# Patient Record
Sex: FEMALE | Race: WHITE | NOT HISPANIC OR LATINO | Employment: UNEMPLOYED | URBAN - METROPOLITAN AREA
[De-identification: names, ages, dates, MRNs, and addresses within clinical notes are randomized per-mention and may not be internally consistent; named-entity substitution may affect disease eponyms.]

---

## 2017-12-15 ENCOUNTER — HISTORICAL (OUTPATIENT)
Dept: LAB | Facility: HOSPITAL | Age: 42
End: 2017-12-15

## 2017-12-20 ENCOUNTER — HISTORICAL (OUTPATIENT)
Dept: LAB | Facility: HOSPITAL | Age: 42
End: 2017-12-20

## 2020-01-07 ENCOUNTER — HISTORICAL (OUTPATIENT)
Dept: ADMINISTRATIVE | Facility: HOSPITAL | Age: 45
End: 2020-01-07

## 2020-06-30 ENCOUNTER — HISTORICAL (OUTPATIENT)
Dept: ADMINISTRATIVE | Facility: HOSPITAL | Age: 45
End: 2020-06-30

## 2020-07-15 ENCOUNTER — HISTORICAL (OUTPATIENT)
Dept: ADMINISTRATIVE | Facility: HOSPITAL | Age: 45
End: 2020-07-15

## 2020-09-02 ENCOUNTER — HISTORICAL (OUTPATIENT)
Dept: ADMINISTRATIVE | Facility: HOSPITAL | Age: 45
End: 2020-09-02

## 2021-01-20 ENCOUNTER — HISTORICAL (OUTPATIENT)
Dept: ADMINISTRATIVE | Facility: HOSPITAL | Age: 46
End: 2021-01-20

## 2021-02-08 ENCOUNTER — HISTORICAL (OUTPATIENT)
Dept: ADMINISTRATIVE | Facility: HOSPITAL | Age: 46
End: 2021-02-08

## 2021-03-02 ENCOUNTER — HISTORICAL (OUTPATIENT)
Dept: ADMINISTRATIVE | Facility: HOSPITAL | Age: 46
End: 2021-03-02

## 2021-10-12 ENCOUNTER — HISTORICAL (OUTPATIENT)
Dept: ADMINISTRATIVE | Facility: HOSPITAL | Age: 46
End: 2021-10-12

## 2021-10-12 LAB
ABS NEUT (OLG): 11.58 X10(3)/MCL (ref 2.1–9.2)
ALBUMIN SERPL-MCNC: 3.9 GM/DL (ref 3.5–5)
ALBUMIN/GLOB SERPL: 1.1 RATIO (ref 1.1–2)
ALP SERPL-CCNC: 128 UNIT/L (ref 40–150)
ALT SERPL-CCNC: 20 UNIT/L (ref 0–55)
AST SERPL-CCNC: 14 UNIT/L (ref 5–34)
BASOPHILS # BLD AUTO: 0 X10(3)/MCL (ref 0–0.2)
BASOPHILS NFR BLD AUTO: 0 %
BILIRUB SERPL-MCNC: 0.3 MG/DL
BILIRUBIN DIRECT+TOT PNL SERPL-MCNC: 0.1 MG/DL (ref 0–0.5)
BILIRUBIN DIRECT+TOT PNL SERPL-MCNC: 0.2 MG/DL (ref 0–0.8)
BUN SERPL-MCNC: 19.1 MG/DL (ref 7–18.7)
CALCIUM SERPL-MCNC: 9.8 MG/DL (ref 8.4–10.2)
CHLORIDE SERPL-SCNC: 106 MMOL/L (ref 98–107)
CO2 SERPL-SCNC: 26 MMOL/L (ref 22–29)
CREAT SERPL-MCNC: 0.87 MG/DL (ref 0.55–1.02)
EOSINOPHIL # BLD AUTO: 0 X10(3)/MCL (ref 0–0.9)
EOSINOPHIL NFR BLD AUTO: 0 %
ERYTHROCYTE [DISTWIDTH] IN BLOOD BY AUTOMATED COUNT: 14.9 % (ref 11.5–14.5)
GLOBULIN SER-MCNC: 3.4 GM/DL (ref 2.4–3.5)
GLUCOSE SERPL-MCNC: 96 MG/DL (ref 74–100)
HCT VFR BLD AUTO: 42.2 % (ref 35–46)
HGB BLD-MCNC: 13.9 GM/DL (ref 12–16)
IMM GRANULOCYTES # BLD AUTO: 0.06 10*3/UL
IMM GRANULOCYTES NFR BLD AUTO: 0 %
LYMPHOCYTES # BLD AUTO: 1.7 X10(3)/MCL (ref 0.6–4.6)
LYMPHOCYTES NFR BLD AUTO: 12 %
MCH RBC QN AUTO: 28.2 PG (ref 26–34)
MCHC RBC AUTO-ENTMCNC: 32.9 GM/DL (ref 31–37)
MCV RBC AUTO: 85.6 FL (ref 80–100)
MONOCYTES # BLD AUTO: 0.5 X10(3)/MCL (ref 0.1–1.3)
MONOCYTES NFR BLD AUTO: 4 %
NEUTROPHILS # BLD AUTO: 11.58 X10(3)/MCL (ref 2.1–9.2)
NEUTROPHILS NFR BLD AUTO: 83 %
NRBC BLD AUTO-RTO: 0 % (ref 0–0.2)
PLATELET # BLD AUTO: 469 X10(3)/MCL (ref 130–400)
PMV BLD AUTO: 9.4 FL (ref 7.4–10.4)
POTASSIUM SERPL-SCNC: 3.9 MMOL/L (ref 3.5–5.1)
PROT SERPL-MCNC: 7.3 GM/DL (ref 6.4–8.3)
RBC # BLD AUTO: 4.93 X10(6)/MCL (ref 4–5.2)
SODIUM SERPL-SCNC: 139 MMOL/L (ref 136–145)
WBC # SPEC AUTO: 13.9 X10(3)/MCL (ref 4.5–11)

## 2021-10-25 ENCOUNTER — HISTORICAL (OUTPATIENT)
Dept: ADMINISTRATIVE | Facility: HOSPITAL | Age: 46
End: 2021-10-25

## 2021-10-25 LAB
ABS NEUT (OLG): 6.7 X10(3)/MCL (ref 1.5–6.9)
ALBUMIN SERPL-MCNC: 3.7 GM/DL (ref 3.5–5)
ALBUMIN/GLOB SERPL: 0.9 RATIO (ref 1.1–2)
ALP SERPL-CCNC: 126 UNIT/L (ref 40–150)
ALT SERPL-CCNC: 23 UNIT/L (ref 0–55)
AST SERPL-CCNC: 25 UNIT/L (ref 5–34)
BASOPHILS # BLD AUTO: 0.1 X10(3)/MCL (ref 0–0.1)
BASOPHILS NFR BLD AUTO: 1 % (ref 0–1)
BILIRUB SERPL-MCNC: 0.1 MG/DL
BILIRUBIN DIRECT+TOT PNL SERPL-MCNC: 0 MG/DL (ref 0–0.8)
BILIRUBIN DIRECT+TOT PNL SERPL-MCNC: 0.1 MG/DL (ref 0–0.5)
BUN SERPL-MCNC: 13 MG/DL (ref 7–18.7)
CALCIUM SERPL-MCNC: 9.9 MG/DL (ref 8.4–10.2)
CHLORIDE SERPL-SCNC: 108 MMOL/L (ref 98–107)
CHOLEST SERPL-MCNC: 286 MG/DL
CHOLEST/HDLC SERPL: 3 {RATIO} (ref 0–5)
CK SERPL-CCNC: 84 U/L (ref 29–168)
CO2 SERPL-SCNC: 24 MMOL/L (ref 22–29)
CREAT SERPL-MCNC: 1.1 MG/DL (ref 0.55–1.02)
DEPRECATED CALCIDIOL+CALCIFEROL SERPL-MC: 61.5 NG/ML (ref 30–80)
EOSINOPHIL # BLD AUTO: 0.4 X10(3)/MCL (ref 0–0.6)
EOSINOPHIL NFR BLD AUTO: 4 % (ref 0–5)
ERYTHROCYTE [DISTWIDTH] IN BLOOD BY AUTOMATED COUNT: 15.6 % (ref 11.5–17)
GLOBULIN SER-MCNC: 3.9 GM/DL (ref 2.4–3.5)
GLUCOSE SERPL-MCNC: 94 MG/DL (ref 74–100)
HCT VFR BLD AUTO: 44.8 % (ref 36–48)
HDLC SERPL-MCNC: 82 MG/DL (ref 35–60)
HGB BLD-MCNC: 14.1 GM/DL (ref 12–16)
IMM GRANULOCYTES # BLD AUTO: 0.04 10*3/UL (ref 0–0.02)
IMM GRANULOCYTES NFR BLD AUTO: 0.4 % (ref 0–0.43)
LDLC SERPL CALC-MCNC: 191 MG/DL (ref 50–140)
LYMPHOCYTES # BLD AUTO: 2.8 X10(3)/MCL (ref 0.5–4.1)
LYMPHOCYTES NFR BLD AUTO: 27 % (ref 15–40)
MCH RBC QN AUTO: 27 PG (ref 27–34)
MCHC RBC AUTO-ENTMCNC: 32 GM/DL (ref 31–36)
MCV RBC AUTO: 86 FL (ref 80–99)
MONOCYTES # BLD AUTO: 0.6 X10(3)/MCL (ref 0–1.1)
MONOCYTES NFR BLD AUTO: 6 % (ref 4–12)
NEUTROPHILS # BLD AUTO: 6.7 X10(3)/MCL (ref 1.5–6.9)
NEUTROPHILS NFR BLD AUTO: 63 % (ref 43–75)
PLATELET # BLD AUTO: 392 X10(3)/MCL (ref 140–400)
PMV BLD AUTO: 9.6 FL (ref 6.8–10)
POTASSIUM SERPL-SCNC: 4.4 MMOL/L (ref 3.5–5.1)
PROT SERPL-MCNC: 7.6 GM/DL (ref 6.4–8.3)
RBC # BLD AUTO: 5.19 X10(6)/MCL (ref 4.2–5.4)
SODIUM SERPL-SCNC: 143 MMOL/L (ref 136–145)
TRIGL SERPL-MCNC: 67 MG/DL (ref 37–140)
TSH SERPL-ACNC: 2.74 UIU/ML (ref 0.35–4.94)
VLDLC SERPL CALC-MCNC: 13 MG/DL
WBC # SPEC AUTO: 10.6 X10(3)/MCL (ref 4.5–11.5)

## 2022-04-07 ENCOUNTER — HISTORICAL (OUTPATIENT)
Dept: ADMINISTRATIVE | Facility: HOSPITAL | Age: 47
End: 2022-04-07

## 2022-04-23 VITALS
WEIGHT: 168.44 LBS | SYSTOLIC BLOOD PRESSURE: 147 MMHG | BODY MASS INDEX: 31 KG/M2 | HEIGHT: 62 IN | DIASTOLIC BLOOD PRESSURE: 102 MMHG

## 2022-04-30 NOTE — ED PROVIDER NOTES
Patient:   Vimal Beasley             MRN: [id number]            FIN: [id number]               Age:   45 years     Sex:  Female     :  1975   Associated Diagnoses:   Fracture of shaft of tibia and fibula   Author:   Isidro Jones MD      Basic Information   Time seen: Date & time 2020 05:48:00.   History source: Patient.   Arrival mode: Ambulance.   History limitation: None.   Additional information: Chief Complaint from Nursing Triage Note : Chief Complaint   2020 5:37 CDT       Chief Complaint           c/o walking onto porch and twisting ankle and falling, pain noted to right lower leg, deformity noted, daisha splint in place per EMS, motor/sensation intact, pulse noted with doppled, 100 mcg of fentanyl given per EMS.  (Modified) .   Provider/Visit info:   Time Seen:  Isidro Jones MD / 2020 05:45  .   History of Present Illness   The patient presents with 45-year-old female presents via EMS with right ankle pain just prior to arrival.  Patient states she lost her footing and twisted her ankle.  Denies LOC.  No other complaints.  Patient received 100 µg of fentanyl en Route..  The onset was just prior to arrival.  The course/duration of symptoms is constant.  Type of injury: fall.  Location: Right leg. The character of symptoms is pain and swelling.  The degree at present is moderate.  The exacerbating factor is movement.  The relieving factor is rest.  Risk factors consist of none.  Prior episodes: none.  Therapy today: see nurses notes.  Associated symptoms: none.        Review of Systems   Constitutional symptoms:  Negative except as documented in HPI.   Skin symptoms:  Negative except as documented in HPI.   Eye symptoms:  Negative except as documented in HPI.   ENMT symptoms:  Negative except as documented in HPI.   Respiratory symptoms:  Negative except as documented in HPI.   Cardiovascular symptoms:  Negative except as documented in HPI.   Gastrointestinal symptoms:  Negative  except as documented in HPI.   Genitourinary symptoms:  Negative except as documented in HPI.   Musculoskeletal symptoms:  Joint pain.   Neurologic symptoms:  Negative except as documented in HPI.   Psychiatric symptoms:  Negative except as documented in HPI.   Endocrine symptoms:  Negative except as documented in HPI.   Hematologic/Lymphatic symptoms:  Negative except as documented in HPI.   Allergy/immunologic symptoms:  Negative except as documented in HPI.      Health Status   Allergies:    Allergic Reactions (Selected)  No Known Medication Allergies,    Allergies (1) Active Reaction  No Known Medication Allergies None Documented  .   Medications:  (Selected)   Prescriptions  Prescribed  Magic Mouthwash: Magic Mouthwash, See Instructions, Take 15ml (1 tablespoon) by mouth 4 times per day as needed for oral / throat pain, # 240 mL, 1 Refill(s), Pharmacy: [facility], 163, cm, Height/Length Dosing, 05/02/20 15:40:00 CDT, 80, kg, Weight Dosin...  amlodipine 5 mg oral tablet: See Instructions, TAKE 1 TABLET BY MOUTH EVERY DAY, # 30 tab(s), 4 Refill(s), Pharmacy: [facility] #[id number], 163, cm, Height/Length Dosing, 05/02/20 15:40:00 CDT, 80, kg, Weight Dosing, 05/02/20 15:40:00 CDT  folic acid 1 mg oral tablet: 1 mg = 1 tab(s), Oral, Daily, # 30 tab(s), 0 Refill(s), Pharmacy: [facility] #[id number], 163, cm, Height/Length Dosing, 05/02/20 15:40:00 CDT, 80, kg, Weight Dosing, 05/02/20 15:40:00 CDT  Documented Medications  Documented  DULoxetine 60 mg oral delayed release capsule: 60 mg = 1 cap(s), Oral, BID  QUEtiapine 100 mg oral tablet: 100 mg = 1 tab(s), Oral, qPM  Rasuvo 20 mg/0.4 mL subcutaneous solution: 20 mg, Subcutaneous, qWeek  buPROPion 300 mg/24 hours (XL) oral tablet, extended release: 300 mg = 1 tab(s), Oral, Daily  clonazePAM 0.5 mg oral tablet: 0.5 mg = 1 tab(s), Oral, Daily  tiZANidine 2 mg oral tablet: 2 mg = 1 tab(s), Oral, BID.      Past Medical/ Family/ Social History    Medical history:    Active  HTN (hypertension) (5247100784)  High risk medication use (855240629)  Depression with anxiety (321194625)  HLD (hyperlipidemia) (80176666)  Raynaud's phenomenon (695154715)  Resolved  Rheumatoid arthritis (347566582):  Resolved., Reviewed as documented in chart.   Surgical history:    Bilateral tubal ligation (274481979).  Caesarean section (83073119).    , Reviewed as documented in chart.   Family history:    Secondary cancer of bone  Mother  Diabetes mellitus type 2  Mother  Sister  Eczema  Sister  Mother  Bipolar  Mother  Brother  Sister  Metastatic cancer  Father  Asthma.  Brother  Cataract.  Mother  Hypertension.  Mother  Sister  Alcoholism.  Mother  Hypothyroidism.  Sister  Depression.  Brother  Sister  Renal failure syndrome.  Sister  Drug addiction.  Sister  Brother  , Reviewed as documented in chart.   Social history:    Social & Psychosocial Habits    Alcohol  05/13/2018  Use: Never    Employment/School  11/07/2019  Status: Unemployed    Home/Environment  05/13/2018  Living situation: Home/Independent    Nutrition/Health  11/07/2019  Home Diet Regular    Sexual  11/07/2019  Sexually active: No    What is your current gender identity? (Check all that apply) Identifies as female    Substance Use  05/13/2018  Use: Never    Tobacco  03/02/2020  Use: 10 or more cigarettes (1/    Patient Wants Consult For Cessation Counseling No    05/02/2020  Use: 10 or more cigarettes (1/    Patient Wants Consult For Cessation Counseling No    06/30/2020  Use: 10 or more cigarettes (1/    Patient Wants Consult For Cessation Counseling No    Abuse/Neglect  03/02/2020  SHX Any signs of abuse or neglect No    Feels unsafe at home: No    Safe place to go: Yes    05/02/2020  SHX Any signs of abuse or neglect No    06/30/2020  SHX Any signs of abuse or neglect No  , Reviewed as documented in chart, Tobacco use: Regularly.   Problem list:    Active Problems (9)  Depression with anxiety   High risk  medication use   HLD (hyperlipidemia)   HTN (hypertension)   Medication overuse headache   Obesity   Raynaud's phenomenon   Rheumatoid arthritis   Tobacco user   , per nurse's notes.      Physical Examination               Vital Signs      Vital Signs (last 24 hrs)_____  Last Charted___________  Temp Oral     36.3 DegC  (JUN 30 05:37)  Heart Rate Peripheral   81 bpm  (JUN 30 05:37)  Resp Rate         18 br/min  (JUN 30 05:37)  SBP      117 mmHg  (JUN 30 05:37)  DBP      72 mmHg  (JUN 30 05:37)  SpO2      100 %  (JUN 30 05:37)  .   General:  Alert, no acute distress.    Skin:  Warm, dry.    Head:  Normocephalic.   Neck:  Supple.   Eye:  Extraocular movements are intact, normal conjunctiva.    Ears, nose, mouth and throat:  Oral mucosa moist.   Cardiovascular:  Regular rate and rhythm.   Respiratory:  Lungs are clear to auscultation, respirations are non-labored.    Chest wall:  No tenderness.   Back:  Nontender, Normal range of motion.    Gastrointestinal:  Soft, Nontender, Non distended.    Neurological:  No focal neurological deficit observed.   Lymphatics:  No lymphadenopathy.   Psychiatric:  Cooperative, appropriate mood & affect.       Medical Decision Making   Documents reviewed:  Emergency department nurses' notes, emergency department records, prior records.    Orders  Launch Order Profile (Selected)   Inpatient Orders  Ordered (Collected)  CMP: STAT collect, 06/30/20 6:06:10 CDT, BLOOD, Collected, Stop date 06/30/20 6:05:00 CDT, Lab Collect  INR - Protime: STAT collect, 06/30/20 6:06:10 CDT, BLOOD, Collected, Stop date 06/30/20 6:05:00 CDT, Lab Collect  Ordered (Exam Completed)  CXR 1 View: Stat, 06/30/20 5:51:00 CDT, Post-Op, None, Stretcher, Rad Type, Not Scheduled, 06/30/20 5:51:00 CDT  XR Ankle Right Minimum 3 Views: Stat, 06/30/20 5:46:00 CDT, Fall, None, Stretcher, Rad Type, Not Scheduled, 06/30/20 5:46:00 CDT  XR Tibia-Fibula Right 2 Views: Stat, 06/30/20 5:41:00 CDT, Fall, None, Stretcher, Rad Type,  Not Scheduled, 06/30/20 5:41:00 CDT  Completed  Automated Diff: Now collect, 06/30/20 6:05:00 CDT, Blood, Collected, Stop date 06/30/20 6:05:00 CDT, Lab Collect, 06/30/20 5:45:00 CDT  CBC w/ Auto Diff: NOW collect, 06/30/20 6:06:10 CDT, BLOOD, Collected, Stop date 06/30/20 6:05:00 CDT, Lab Collect  EKG Adult 12 Lead: 06/30/20 5:51:00 CDT, Stat, Once, 06/30/20 5:51:00 CDT, -1, -1, 06/30/20 5:51:00 CDT  .   Electrocardiogram:  Time 6/30/2020 06:05:00, rate 77, normal sinus rhythm, No ST-T changes, no ectopy, normal MN & QRS intervals, Short MN interval.    Results review:  Lab results : Lab View   6/30/2020 6:05 CDT       WBC                       11.6 x10(3)/mcL  HI                             RBC                       4.01 x10(6)/mcL  LOW                             Hgb                       11.8 gm/dL  LOW                             Hct                       37.1 %                             Platelet                  222 x10(3)/mcL                             MCV                       92 fL                             MCH                       29 pg                             MCHC                      32 gm/dL                             RDW                       16.0 %                             MPV                       10.0 fL                             Abs Neut                  8.8 x10(3)/mcL  HI                             Neutro Auto               76 %  HI                             Lymph Auto                14 %  LOW                             Mono Auto                 5 %                             Eos Auto                  4 %                             Abs Eos                   0.4 x10(3)/mcL                             Basophil Auto             0 %                             Abs Neutro                8.8 x10(3)/mcL  HI                             Abs Lymph                 1.6 x10(3)/mcL                             Abs Mono                  0.6 x10(3)/mcL                             Abs Baso                   0.0 x10(3)/mcL                             IG%                       0.600 %  HI                             IG#                       0.0700  HI    , Lab results : Lab View   6/30/2020 6:05 CDT       Sodium Lvl                140 mmol/L                             Potassium Lvl             4.1 mmol/L                             Chloride                  105 mmol/L                             CO2                       26 mmol/L                             Calcium Lvl               9.2 mg/dL                             Glucose Lvl               97 mg/dL                             BUN                       22.0 mg/dL  HI                             Creatinine                0.85 mg/dL                             eGFR-AA                   >60  NA                             eGFR-LALO                  >60  NA                             Bili Total                0.1 mg/dL                             Bili Direct               0.1 mg/dL                             Bili Indirect             0.00 mg/dL                             AST                       19 unit/L                             ALT                       15 unit/L                             Alk Phos                  125 unit/L                             Total Protein             6.3 gm/dL  LOW                             Albumin Lvl               3.3 gm/dL  LOW                             Globulin                  3.0 gm/dL                             A/G Ratio                 1.1 ratio                             PT                        12.8 second(s)  HI                             INR                       1.0    ,    No qualifying data available.    Tibia-fibula x-ray findings  Interpretation by Emergency Physician.   Radiology results:  06/30/2020 6:20; Khadijah Marx MD; ; X-ray tib-fib right 2 views: Fracture of the shaft of the tibia and fibula with a butterfly segment. 06/30/2020 6:20; Khadijah Marx MD; ; X-ray ankle 3 views: No  fractures around the ankle, ankle mortise is intact 06/30/2020 6:20; Araseli GIVENS, [facility]; ;     X-ray chest one view: No focal consolidation    .      Reexamination/ Reevaluation   Course: progressing as expected.   Pain status: decreased.   Assessment: exam improved.   Time: 6/30/2020 06:20:00 .   Vital signs   results included from flowsheet : Vital Signs   6/30/2020 6:10 CDT       Peripheral Pulse Rate     80 bpm                             Respiratory Rate          18 br/min                             SpO2                      99 %                             Oxygen Therapy            Room air                             Systolic Blood Pressure   124 mmHg                             Diastolic Blood Pressure  66 mmHg                             Mean Arterial Pressure, Cuff              85 mmHg     Interventions: PowerOrders   Pharmacy:  Sodium Chloride 0.9% intravenous solution (Normal Saline (0.9% NS) IV) (Order): 1,000 mL, 1,000 mL, IV, Once-NOW, 149.25 mL/hr, start date 6/30/2020 6:22 CDT, stop date 6/30/2020 6:22 CDT  Dilaudid 2 mg/mL injectable solution (Order): 1 mg, form: Injection, IM, Once, first dose 6/30/2020 6:22 CDT, stop date 6/30/2020 6:22 CDT, STAT  .   Notes:     I am Dr. Marx, I assumed the care of patient at the beginning of my shift.    Patient presented to the emergency room with right lower extremity pain after she twisted it outside while she was smoking a cigarette        Awake, alert, no distress.  Heart: S1, S2 are audible.  There is no S3 no S4, no murmurs.  Chest is clear to auscultation.  No rales, no rhonchi.  Abdomen is soft, nondistended, nontender.  Bowel sounds are audible.  Extremities: Tenderness and swelling around the calf and shin, some deformity, no skin breakdown noted  CNS: No focal deficit  Capillary refill is less than 2 seconds.  Peripheral pulses are palpable bilaterally symmetrical      Patient's workup in the emergency room reveals she has a tib-fib fracture,  talked to Dr. Ng recommended to admit her and keep her nothing by mouth.  Patient ate at 8:30 last night.      Components of this note were documented using voice recognition systems and are subject to errors not corrected at proof reading.  Please contact the author for any clarifications..      Impression and Plan   Diagnosis   Fracture of shaft of tibia and fibula ([id number] S82.209A)      Calls-Consults   -  6/30/2020 06:12:00 , Hernandez GIVENS, Aleksandr HERNANDEZ, recommends admit, NPO.    Plan   Condition: Stable.    Disposition: Admit time  6/30/2020 06:18:00, Admit to Inpatient Unit, Patient care transitioned to: Time: 6/30/2020 05:51:00, Araseli GIVENS, [facility].    Counseled: Patient, Regarding diagnosis, Regarding diagnostic results, Regarding treatment plan, Regarding prescription, Patient indicated understanding of instructions.    Orders: Launch Orders   Admit/Transfer/Discharge:  Admit as Inpatient (Order): 6/30/2020 6:17 CDT, Medical Unit Hernandez GIVENS, Aleksandr HERNANDEZ, No, Fracture Tib/Fib Closed displaced with Butterfly segment, Rheumatoid Arthritis  .

## 2022-04-30 NOTE — OP NOTE
PREOPERATIVE DIAGNOSIS:  Left tibial fracture.       She has a spiral fracture of the tibia as well as a medial malleolar ankle fracture.    PROCEDURE:  Intramedullary nailing of a tibia and internal fixation of a medial malleolar fracture.    COMPLICATIONS:  None.    DESCRIPTION OF PROCEDURE:  Patient brought to the OR, given IV antibiotics, general anesthesia.  Prepped and draped sterilely.  Mid incision was made anteriorly.  Patella tendon was divided.  Entry wire was used.  I reamed the tibia, passed a guidewire.  She had a proximal 1/3 fibular fracture and a midshaft tibia fracture.  She also had extension of the fracture down distally as well as a medial malleolar fracture.  Once the reynaldo was passed, I locked it proximally and distally, transverse and anterior-posterior screw, and also a medial malleolar screw.  The construct was stable and well reduced.  There were no complications.  Wounds were closed with 0 Vicryl, subcu 2-0, staples, sterile dressing applied.  No complications.        WILL/KATELYN   DD: 08/15/2020 0954   DT: 08/15/2020 1013  Job # [id number]

## 2022-05-01 NOTE — HISTORICAL [facility] CERNER
This is a historical note converted from [place]. Formatting and pictures may have been removed.  Please reference Isabella for original formatting and attached multimedia. Chief Complaint  c/o walking onto porch and twisting ankle and falling, pain noted to right lower leg, deformity noted, daisha splint in place per EMS, motor/sensation intact, pulse noted with doppled, 100 mcg of fentanyl given per EMS.  History of Present Illness  Patient sustained a fall at home?this morning, which resulted in?tib-fib fracture.? She fell as she was going to walk outside and states that she fell with her?foot underneath her buttocks?resulting in the fracture.? She does have rheumatoid arthritis?she takes methotrexate. ?She denies being on any blood thinners.? She does have a daughter that lives at home with her?prior to the incident?she ambulated without assistance.  Review of Systems  Constitutional:?no fever, fatigue, weakness  Eye:?no vision loss, eye redness, drainage, or pain  ENMT:?no sore throat, ear pain, sinus pain/congestion, nasal congestion/drainage  Respiratory:?no cough, no wheezing, no shortness of breath  Cardiovascular:?no chest pain, no dyspnea, no palpitations, no edema  Gastrointestinal:?no nausea, vomiting, or diarrhea. No abdominal pain  Genitourinary:?no dysuria, no urinary frequency or urgency, no hematuria  Hema/Lymph:?no abnormal bruising or bleeding  Endocrine:?no heat or cold intolerance, no excessive thirst or excessive urination  Musculoskeletal:?Right lower extremity:?Splint in place.? Right lower extremity pain  Integumentary:?no skin rash or abnormal lesion  Neurologic: no headache, no dizziness, no weakness or numbness  ?  Physical Exam  Vitals & Measurements  T:?36.5? ?C (Oral)? TMIN:?36.3? ?C (Oral)? TMAX:?36.5? ?C (Oral)? HR:?81(Peripheral)? RR:?18? BP:?101/71? SpO2:?97%? WT:?70?kg? BMI:?28.4?  General:?well-developed well-nourished in no acute distress  Eye: EOMI, clear conjunctiva, eyelids  normal  HENT:?Normocephalic/atraumatic, oropharynx and nasal mucosal surfaces moist  Neck: full range of motion, no lymphadenopathy  Respiratory:?Non-labored breathing  Cardiovascular:?regular rate and rhythm, no edema  Gastrointestinal:?soft, non-tender, non-distended, without masses to palpation  Genitourinary: no CVA tenderness to palpation  Musculoskeletal:?? Right lower extremity:?Skin is intact there is severe pain and tenderness?in the mid to lower?right?tib-fib. ?She is neurovascularly intact?to the distal extremity.  Integumentary: no rashes or skin lesions present  Neurologic: cranial nerves intact, no signs of peripheral neurological deficit, motor/sensory function intact  ?  Assessment/Plan  Fracture of shaft of tibia and fibula?S82.209A  Lower leg pain-swelling?[id number]  Fracture of right?midshaft tibia,?right proximal fibula shaft. ?We will proceed with open reduction internal fixation?of the midshaft tibia fracture,?with nailing.? Risks and benefits of the surgery were discussed with the patient?and daughter. ?Consents were signed and questions were answered.? She will be placed on antibiotics, pain control?and aspirin 325 mg?twice daily for DVT prophylaxis.   Problem List/Past Medical History  Ongoing  Depression with anxiety  High risk medication use  HLD (hyperlipidemia)  HTN (hypertension)  Medication overuse headache  Obesity  Raynauds phenomenon  Rheumatoid arthritis  Tobacco user  Historical  Rheumatoid arthritis  Procedure/Surgical History  Bilateral tubal ligation  Caesarean section   Medications  Inpatient  Ancef, 2 gm= 50 mL, IV Piggyback, On Call  Dilaudid, 1 mg= 0.5 mL, IV Push, q4hr, PRN  Sodium Chloride 0.9% intravenous solution 1,000 mL, 1000 mL, IV  Zofran, 4 mg= 2 mL, IV Push, q4hr, PRN  Home  acetaminophen-hydrocodone 325 mg-5 mg oral tablet, 1 tab(s), Oral, TID,? ?Not taking  amlodipine 5 mg oral tablet, See Instructions  atenolol 25 mg oral tablet, 25  mg= 1 tab(s), Oral, qPM  buPROPion 150 mg/24 hours (XL) oral tablet, extended release, 150 mg= 1 tab(s), Oral, Daily  buPROPion 300 mg/24 hours (XL) oral tablet, extended release, 300 mg= 1 tab(s), Oral, Daily  busPIRone 10 mg oral tablet, 10 mg= 1 tab(s), Oral, BID  clonazePAM 0.5 mg oral tablet, 0.5 mg= 1 tab(s), Oral, Daily,? ?Not taking  doxycycline monohydrate 100 mg oral capsule, 100 mg= 1 cap(s), Oral, BID,? ?Not taking  DULoxetine 60 mg oral delayed release capsule, 60 mg= 1 cap(s), Oral, BID  folic acid 1 mg oral tablet, 1 mg= 1 tab(s), Oral, Daily,? ?Not taking  nystatin 100,000 units/mL oral suspension, 210500 units= 1 mL, Oral, QID,? ?Not taking  QUEtiapine 100 mg oral tablet, 100 mg= 1 tab(s), Oral, qPM  Rasuvo 20 mg/0.4 mL subcutaneous solution, 20 mg, Subcutaneous, qWeek  tiZANidine 2 mg oral tablet, 2 mg= 1 tab(s), Oral, BID,? ?Not taking  Allergies  No Known Medication Allergies  Social History  Abuse/Neglect  No, 06/30/2020  No, 05/02/2020  No, No, Yes, 03/02/2020  Alcohol  Never, 05/13/2018  Employment/School  Unemployed, 11/07/2019  Home/Environment  Living situation: Home/Independent., 05/13/2018  Nutrition/Health  Regular, 11/07/2019  Sexual  Sexually active: No. Gender Identity Identifies as female., 11/07/2019  Substance Use  Never, 05/13/2018  Tobacco  10 or more cigarettes (1/2 pack or more)/day in last 30 days, No, 06/30/2020  10 or more cigarettes (1/2 pack or more)/day in last 30 days, No, 05/02/2020  10 or more cigarettes (1/2 pack or more)/day in last 30 days, No, 03/02/2020  Family History  Alcoholism.: Mother.  Asthma.: Brother.  Bipolar: Mother, Sister and Brother.  Cataract.: Mother.  Depression.: Sister and Brother.  Diabetes mellitus type 2: Mother and Sister.  Drug addiction.: Sister and Brother.  Eczema: Mother and Sister.  Hypertension.: Mother and Sister.  Hypothyroidism.: Sister.  Metastatic cancer: Father.  Renal failure syndrome.: Sister.  Secondary cancer of bone:  Mother.  Immunizations  Vaccine Date Status   influenza virus vaccine, inactivated 11/07/2019 Given   Lab Results  Test Name Test Result Date/Time   Chloride 105 mmol/L 06/30/2020 06:05 CDT   CO2 26 mmol/L 06/30/2020 06:05 CDT   BUN 22.0 mg/dL (High) 06/30/2020 06:05 CDT   Creatinine 0.85 mg/dL 06/30/2020 06:05 CDT   PT 12.8 second(s) (High) 06/30/2020 06:05 CDT   INR 1.0 06/30/2020 06:05 CDT   WBC 11.6 x10(3)/mcL (High) 06/30/2020 06:05 CDT   Hgb 11.8 gm/dL (Low) 06/30/2020 06:05 CDT   Hct 37.1 % 06/30/2020 06:05 CDT   Platelet 222 x10(3)/mcL 06/30/2020 06:05 CDT   Diagnostic Results  (06/30/2020 06:00 CDT XR Tibia-Fibula Right 2 Views)  ?  IMPRESSION:  1. Mildly displaced and mildly angulated comminuted fracture proximal  fibular shaft  2. Mildly displaced and mildly angulated comminuted fracture mid  tibial shaft width nondisplaced extension of the fracture into the  medial malleolus/ankle mortise  ? [1]     [1]?XR Tibia-Fibula Right 2 Views; Enrique GIVENS, Rudolph Erwin 06/30/2020 06:00 CDT

## 2022-05-01 NOTE — HISTORICAL [facility] CERNER
This is a historical note converted from Isabella. Formatting and pictures may have been removed.  Please reference Isabella for original formatting and attached multimedia. Chief Complaint  c/o walking onto porch and twisting ankle and falling, pain noted to right lower leg, deformity noted, daisha splint in place per EMS, motor/sensation intact, pulse noted with doppled, 100 mcg of fentanyl given per EMS.  Reason for Consultation  medical evaluation  History of Present Illness  46yo female admitted by Ortho s/p a fall resulting in multiple fractures for her right lower leg.? She is s/p ORIF today.? Hospitalist have been consulted for medical evaluation.? She has h/o RA, HTN and hyperlipidemia.? She is a little lethargic at this time s/p surgery.? Currently no N/V/DC.? No fever/chills.? No chest pain/SOB.  Review of Systems  ?  ?????Constitutional: ?No fever, No chills, No sweats, No weakness, No fatigue. ?  ?????Eye: ?No double vision, No dry eyes, No photophobia. ?  ?????Ear/Nose/Mouth/Throat: ?No ear pain, No nasal congestion, No sore throat. ?  ?????Respiratory: ?No shortness of breath, No cough, No sputum production, No hemoptysis, No wheezing, No pleuritic pain. ?  ?????Cardiovascular: ?No chest pain, No palpitations, No peripheral edema, No syncope. ?  ?????Gastrointestinal: ?No nausea, No vomiting, No diarrhea, No constipation, No heartburn, No abdominal pain. ?  ?????Genitourinary: ?No dysuria, No hematuria, No change in urine stream. ?  ?????Hematology/Lymphatics: ?No anemia, No bruising tendency, No bruise, No hemorrhage, No petechiae. ?  ?????Endocrine: ?No excessive thirst, No polyuria, No cold intolerance. ?  ?????Immunologic: ?No recurrent fevers, No recurrent infections. ?  ?????Musculoskeletal: +?Joint pain, No back pain, No muscle pain, No decreased range of motion. ?  ?????Integumentary: ?No rash, No pruritus, No petechiae, No skin lesion. ?  ?????Neurologic: ?Alert and oriented X4, No abnormal balance,  No confusion, No tingling. ?  ?????Psychiatric: ?No anxiety, No depression. ?  Physical Exam  Vitals & Measurements  T:?36.2? ?C (Temporal Artery)? TMIN:?36.0? ?C (Temporal Artery)? TMAX:?36.5? ?C (Oral)? HR:?107(Peripheral)? RR:?14? BP:?137/90? SpO2:?94%? WT:?70?kg? BMI:?28.4?  General: ?Alert and oriented, No acute distress. ?  ??????? ? Appearance: Well nourished. ?  ??????? ? Behavior: Appropriate. ?  ??????? ? Skin: Normal for ethnicity. ?  ?????Eye: ?Pupils are equal, round and reactive to light, Extraocular movements are intact. ?  ?????HENT: ?Normocephalic, Normal hearing, Oral mucosa is moist, No pharyngeal erythema. ?  ?????Neck: ?Supple, Non-tender, No carotid bruit, No jugular venous distention, No lymphadenopathy, No thyromegaly. ?  ?????Respiratory: ?Lungs are clear to auscultation, Breath sounds are equal, No chest wall tenderness. ?  ?????Cardiovascular: ?Normal rate, Regular rhythm, No murmur, No gallop, Good pulses equal in all extremities, Normal peripheral perfusion, No edema. ?  ?????Gastrointestinal: ?Soft, Non-tender, Non-distended, Normal bowel sounds, No organomegaly. ?  ?????Genitourinary: ?No costovertebral angle tenderness, No urethral discharge. ?  ?????Lymphatics: ?No lymphadenopathy neck, axilla, groin. ?  ?????Musculoskeletal: ?Normal range of motion, Normal strength, No tenderness, No swelling, No gait. ?  ?????Integumentary: ?Warm, Dry, Pink, Intact, No rash. ?  ?????Neurologic: ?Alert, Oriented, Normal sensory, Normal motor function, No focal deficits, Cranial Nerves II-XII are grossly intact. ?  ?????Psychiatric: ?Cooperative, Appropriate mood & affect, Normal judgment. ?  Assessment/Plan  1.?Fracture of shaft of tibia and fibula?S82.209A  2.?Lower leg pain-swelling?[id number]  3.?HTN (hypertension)?I10  4.?HLD (hyperlipidemia)?E78.5  5.?Rheumatoid arthritis?M06.9  6.?Tobacco user?Z72.0  Orders:  amLODIPine, See Instructions, form: Tab, Oral, Daily, first  dose 07/01/20 9:00:00 CDT  atenolol, 25 mg, form: Tab, Oral, qPM, first dose 06/30/20 21:00:00 CDT  busPIRone, 10 mg, form: Tab, Oral, BID, first dose 06/30/20 21:00:00 CDT  DULoxetine, 60 mg, form: Cap-DR, Oral, BID, first dose 06/30/20 21:00:00 CDT  QUEtiapine, 100 mg, form: Tab, Oral, qPM, first dose 06/30/20 21:00:00 CDT  Template Non-Formulary Med, 300 mg, form: ERT, Oral, Daily, first dose 07/01/20 9:00:00 CDT  review home meds and resume  follow labs  DVT prophylaxis  PT consult  advised to stop smoking.? offer nicotine patch.? She is not interested in stopping at this time(cessation=3mins)  ?   Problem List/Past Medical History  Ongoing  Depression with anxiety  High risk medication use  HLD (hyperlipidemia)  HTN (hypertension)  Medication overuse headache  Obesity  Raynauds phenomenon  Rheumatoid arthritis  Tobacco user  Historical  Rheumatoid arthritis  Procedure/Surgical History  Intramedullary Herminio Insertion Tibia (Right) (06/30/2020)  ORIF Ankle (Right) (06/30/2020)  Bilateral tubal ligation  Caesarean section   Medications  Inpatient  amlodipine 5 mg oral tablet, See Instructions, Oral, Daily  Ancef 2gm/50ml D5W (Premix), 2 gm= 50 mL, IV Piggyback, q6hr  ascorbic acid, 500 mg= 2 tab(s), Oral, At Bedtime  atenolol 25 mg oral tablet, 25 mg= 1 tab(s), Oral, qPM  buPROPion 300 mg/24 hours (XL) oral tablet, extended release, 300 mg, Oral, Daily  BuSpar 5 mg oral tablet, 10 mg= 2 tab(s), Oral, BID  Dextrose 50% and Water (50 mL vial/syringe), 12.5 gm= 25 mL, IV Push, Once, PRN  Dextrose 50% and Water (50 mL vial/syringe), 12.5 gm= 25 mL, IV Push, As Directed, PRN  Dextrose 50% and Water (50 mL vial/syringe), 25 gm= 50 mL, IV Push, As Directed, PRN  Dextrose 50% in Water intravenous solution, 12.5 gm= 25 mL, IV Push, As Directed, PRN  Dilaudid, 1 mg= 0.5 mL, IV Push, q2hr, PRN  Dilaudid, 1 mg= 0.5 mL, IV Push, q4hr, PRN  DULoxetine 60 mg oral delayed release capsule, 60 mg= 1 cap(s), Oral, BID  Ecotrin 325 mg  oral enteric coated tablet, 325 mg= 1 tab(s), Oral, BID  glucagon, 1 mg= 1 EA, IM, q10min, PRN  glucagon, 1 mg= 1 EA, IM, q10min, PRN  insulin lispro, 2-14 units, Subcutaneous, As Directed, PRN  Lactated Ringers Injection intravenous solution 1,000 mL, 1000 mL, IV  Norco 10 mg-325 mg oral tablet, 1 tab(s), Oral, q4hr, PRN  Norco 10 mg-325 mg oral tablet, 1 tab(s), Oral, q4hr, PRN  QUEtiapine 100 mg oral tablet, 100 mg= 1 tab(s), Oral, qPM  Restoril 15 mg oral capsule, 15 mg= 1 cap(s), Oral, At Bedtime, PRN  Senokot S, 2 tab(s), Oral, At Bedtime, PRN  Sodium Chloride 0.9% intravenous solution 1,000 mL, 1000 mL, IV  Surfak, 240 mg= 1 cap(s), Oral, qPM  Toradol, 30 mg= 1 mL, IV Push, q6hr, PRN  Valium, 5 mg= 1 tab(s), Oral, q6hr, PRN  Zofran, 8 mg= 2 tab(s), Oral, q8hr, PRN  Zofran, 4 mg= 2 mL, IV Push, q4hr, PRN  Home  acetaminophen-hydrocodone 325 mg-5 mg oral tablet, 1 tab(s), Oral, TID,? ?Not taking  amlodipine 5 mg oral tablet, See Instructions  atenolol 25 mg oral tablet, 25 mg= 1 tab(s), Oral, qPM  buPROPion 150 mg/24 hours (XL) oral tablet, extended release, 150 mg= 1 tab(s), Oral, Daily  buPROPion 300 mg/24 hours (XL) oral tablet, extended release, 300 mg= 1 tab(s), Oral, Daily  busPIRone 10 mg oral tablet, 10 mg= 1 tab(s), Oral, BID  clonazePAM 0.5 mg oral tablet, 0.5 mg= 1 tab(s), Oral, Daily,? ?Not taking  doxycycline monohydrate 100 mg oral capsule, 100 mg= 1 cap(s), Oral, BID,? ?Not taking  DULoxetine 60 mg oral delayed release capsule, 60 mg= 1 cap(s), Oral, BID  folic acid 1 mg oral tablet, 1 mg= 1 tab(s), Oral, Daily,? ?Not taking  nystatin 100,000 units/mL oral suspension, [id number] units= 1 mL, Oral, QID,? ?Not taking  QUEtiapine 100 mg oral tablet, 100 mg= 1 tab(s), Oral, qPM  Rasuvo 20 mg/0.4 mL subcutaneous solution, 20 mg, Subcutaneous, qWeek  tiZANidine 2 mg oral tablet, 2 mg= 1 tab(s), Oral, BID,? ?Not taking  Allergies  No Known Medication Allergies  Social History  Abuse/Neglect  No,  06/30/2020  No, 05/02/2020  No, No, Yes, 03/02/2020  Alcohol  Never, 05/13/2018  Employment/School  Unemployed, 11/07/2019  Home/Environment  Living situation: Home/Independent., 05/13/2018  Nutrition/Health  Regular, 11/07/2019  Sexual  Sexually active: No. Gender Identity Identifies as female., 11/07/2019  Substance Use  Never, 05/13/2018  Tobacco  10 or more cigarettes (1/2 pack or more)/day in last 30 days, No, 06/30/2020  10 or more cigarettes (1/2 pack or more)/day in last 30 days, No, 05/02/2020  10 or more cigarettes (1/2 pack or more)/day in last 30 days, No, 03/02/2020  Family History  Alcoholism.: Mother.  Asthma.: Brother.  Bipolar: Mother, Sister and Brother.  Cataract.: Mother.  Depression.: Sister and Brother.  Diabetes mellitus type 2: Mother and Sister.  Drug addiction.: Sister and Brother.  Eczema: Mother and Sister.  Hypertension.: Mother and Sister.  Hypothyroidism.: Sister.  Metastatic cancer: Father.  Renal failure syndrome.: Sister.  Secondary cancer of bone: Mother.  Immunizations  Vaccine Date Status   influenza virus vaccine, inactivated 11/07/2019 Given   Lab Results  Test Name Test Result Date/Time   Chloride 105 mmol/L 06/30/2020 06:05 CDT   CO2 26 mmol/L 06/30/2020 06:05 CDT   BUN 22.0 mg/dL (High) 06/30/2020 06:05 CDT   Creatinine 0.85 mg/dL 06/30/2020 06:05 CDT   PT 12.8 second(s) (High) 06/30/2020 06:05 CDT   INR 1.0 06/30/2020 06:05 CDT   WBC 11.6 x10(3)/mcL (High) 06/30/2020 06:05 CDT   Hgb 11.8 gm/dL (Low) 06/30/2020 06:05 CDT   Hct 37.1 % 06/30/2020 06:05 CDT   Platelet 222 x10(3)/mcL 06/30/2020 06:05 CDT

## 2022-05-01 NOTE — HISTORICAL [facility] CERNER
This is a historical note converted from Isabella. Formatting and pictures may have been removed.  Please reference Ceralena for original formatting and attached multimedia. Chief Complaint  Established  History of Present Illness  ???? Ms. Beasley is a 45 y/o female who presents for Rheumatology?follow up w/RA. She was referred here from [facility]. Reports after her last pregnancy ~17 years ago she started having a migratory pattern of?joint pain that gradually got worse. Labs in the system from 2017 show RF in the 500s and CCP >250 by [facility]. Dr. Thomas, PCP,?has been seeing her and started her on 10mg/wk of MTX w/ folic acid. She reports noticeable difference. Morning stiffness is 30-60 minutes, +gelling. She reports this has not changed with MTX, but she has had the biggest difference in the amount of flares she was having prior. She reports losing  strength. She is taking 2 packs of BC powder when she wakes up and then 4 more packs throughout the day. Has been on steroids in the past for flares. She is reporting episodes of nausea following MTX dosing that last 3 days.  ?   ?? At initial visit, I restarted MTX. She is on 8 tabs/wk now and feels her dz activity noticeable improved, specifically amount of flares. She is reporting nausea and vomiting a few days following MTX, but states she never recd subcu Rasvuo. Will resubmit today. Also, she has continued BC powder, now reporting two packs in the morning and two in the evening for headaches. Gives reported hx of migraines dx at the age of 13. States daily headaches described a snagging?and?aching w/sensitivity to noise and light. States she has had a few cousins who have had brain aneurysms.  ?  ?  ROS: Denies eye inflammation. +cough, shortness of breath ?relating to smoking hx.  ?   PMH: HTN, HLD, Anxiety / Depression, vascular migraine headaches.  Social Hx: Denies ETOH, drug use. +TBO 1-1.5 PPD x 32 years  FH AI dz: Maternal aunt w/ RA, 2  great-aunts w/ RA, 1 first?cousin (maternal side) w/RA  Review of Systems  General: Denies chills and fever  Ophthalmologic: Denies discharge. Denies flashes of light in visual field  ENT: Denies decreased sense of smell. Denies nosebleeds.?  Endocrine: Denies excessive sweating. Denies weight loss  Respiratory: Denies hemoptysis. Denies tuberculosis.  Cardiovascular: Denies cyanosis. Denies rheumatic fever.  Gastrointestinal: Denies difficulty swallowing. Denies hematemesis.  Hematology: Denies bleeding problems. Denies recent transfusion.  Peripheral vascular: Denies absent pulses in feet. Denies ulceration of the feet  Skin: Denies blistering of skin. Denies skin cancer.  Neurologic denies seizures. Denies stroke.  Psychiatric: Denies eating disorder. Denies loss of appetite.?  Physical Exam  Vitals & Measurements  T:?36.8? ?C (Oral)? HR:?99(Peripheral)? RR:?18? BP:?123/84?  HT:?157?cm? WT:?79?kg? BMI:?32.05?  General examination: Alert, pleasant, in no acute distress.  Head: Normocephalic, atraumatic.  Eyes: Conjunctiva clear, upper eyelids normal, lower eyelids normal.  Oral cavity: Mucosa moist, no lesions, fair dentition.  Throat: No exudate, no erythema.  Neck/thyroid: Neck supple, no lymphadenopathy, no thyroid nodules  Skin: No suspicious lesions, warm and dry.  Heart: S1, S2 normal, regular rate and rhythm. No JVD.  Lungs: Clear to auscultation, no accessory muscle use.  Musculoskeletal:?Trace synovitis to right 3rd PIP.? FROM. No synovitis otherwise. No deformities.  Prior: +synovitis 2-3 MCP and 3rd PIP b/l, wrist R>L.  Extremities: No clubbing or cyanosis. +Raynauds  Neurologic: Alert and oriented, cranial nerves II through XII grossly intact. Muscle strength grossly intact. Sensation grossly intact.  Psych: Cognitive function intact, mood/affect full range.?  Assessment/Plan  1. Rheumatoid Arthritis, dx 2017. Labs?in 2017 show  and CCP >250. Reports having a migratory pattern of joint pain about  17 years ago following her last pregnancy and progressively worsening over the years. Her PCP started her on MTX 10mg /wk and folic acid 1mg daily earlier this year. She reports noticeable difference in the amount of flares she was having. Morning stiffness is about 30-60 minutes, w/gelling phenomenon. CDAI is 20, mod dz activity. She does report issues with nausea for about 3 days following the dosage of MTX. Would like to maximize therapy but concern for worsening nausea. Will send for Rasuvo 20mg/wk subcu. She may continue MTX until starting injections.  ?   2. Raynauds. WAQAS, Scleroderma panel, and centromere ab all negative.  ?   3. Shortness of breath and cough. Heavy smoker, check CXR today.  ?   4. Hx of headaches. Lifelong issues. Reportedly dx w/vascular migraine headaches at the age of 13. Reports daily headaches now for which she?take BC powder 2 packets BID (down from 6 packs/day). Characterizes?headaches as nagging, aching. Sensitivity to noise and light.?Refer to Neuro.  ?   5. HTN.?123/84. Continue amlodipine 5mg daily.  ?   6. HLD. Off statin due to muscle aches and spasms.  ?   7. Anxiety/ depression.?Racing thoughts, social anxiety, OCD--since childhood. Given info for self-referral process for [facility].  ?   8. High Risk Medication Use. Hepatitis serologies, Tspot, and CXR normal.  ?  9. Vaccines. 11/7/19.  ?  RTC?3 months. Labs today.  > Today I spent 30 minutes in face-to-face time with the patient, 50% in direct counseling and coordination of care. We discussed the potential diagnoses, therapeutic options, treatment plan and what to expect in the future from this condition.  Referrals  [facility] Internal Referral to Neurology Clinic, Specialty: Neurology, Reason: Headaches, Refer To: Chidi GIVENS, Roxanna SPEAR, [facility] , [address] [facility], [place] [address]., Start: 01/07/20 11:13:00 CST  Clinic Follow up, *Est. 04/07/20 10:30:00 CDT, Order for future visit, Hx of migraine  headaches, [facility]   Problem List/Past Medical History  Ongoing  Depression with anxiety  High risk medication use  HLD (hyperlipidemia)  HTN (hypertension)  Obesity  Raynauds phenomenon  Rheumatoid arthritis  Tobacco user  Historical  Rheumatoid arthritis  Medications  amLODIPine 5 mg oral tablet, 5 mg= 1 tab(s), Oral, Daily, 2 refills  atorvastatin 20 mg oral tablet, 20 mg= 1 tab(s), Oral, qPM,? ?Not taking  DULOXETINE DR 60MG CAPSULES, 60 mg= 1 cap(s), Oral, Daily  folic acid 1 mg oral tablet, 1 mg= 1 tab(s), Oral, Daily, 11 refills  HYDROXYZINE PAMOATE 25MG CAPSULES, 25 mg, Oral, q8hr,? ?Not taking  methotrexate 2.5 mg oral tablet, See Instructions, 1 refills  QUETIAPINE 100MG TABLETS, 100 mg= 1 tab(s), Oral, qPM  Rasuvo 20 mg/0.4 mL subcutaneous solution, 20 mg, Subcutaneous, qWeek, 3 refills  topiramate 25 mg oral tablet, 75 mg= 3 tab(s), Oral, BID,? ?Not taking  Allergies  No Known Medication Allergies  Social History  Abuse/Neglect  No, 11/07/2019  Alcohol  Never, 05/13/2018  Employment/School  Unemployed, 11/07/2019  Home/Environment  Living situation: Home/Independent., 05/13/2018  Nutrition/Health  Regular, 11/07/2019  Sexual  Sexually active: No. Gender Identity Identifies as female., 11/07/2019  Substance Use  Never, 05/13/2018  Tobacco  10 or more cigarettes (1/2 pack or more)/day in last 30 days, No, 01/07/2020  Immunizations  Vaccine Date Status   influenza virus vaccine, inactivated 11/07/2019 Given   Health Maintenance  Health Maintenance  ???Pending?(in the next year)  ??? ??OverDue  ??? ? ? ?Diabetes Screening due??and every?  ??? ? ? ?Cervical Cancer Screening due??08/19/19??and every 3??year(s)  ??? ? ? ?Smoking Cessation due??01/01/20??Variable frequency  ??? ??Due?  ??? ? ? ?Alcohol Misuse Screening due??01/01/20??and every 1??year(s)  ??? ? ? ?Hypertension Management-Education due??01/07/20??and every 1??year(s)  ??? ? ? ?Tetanus Vaccine due??01/07/20??and every  10??year(s)  ??? ??Due In Future?  ??? ? ? ?Hypertension Management-BMP not due until??11/06/20??and every 1??year(s)  ??? ? ? ?Obesity Screening not due until??01/01/21??and every 1??year(s)  ??? ? ? ?Blood Pressure Screening not due until??01/06/21??and every 1??year(s)  ??? ? ? ?Body Mass Index Check not due until??01/06/21??and every 1??year(s)  ??? ? ? ?Hypertension Management-Blood Pressure not due until??01/06/21??and every 1??year(s)  ???Satisfied?(in the past 1 year)  ??? ??Satisfied?  ??? ? ? ?ADL Screening on??01/07/20.??Satisfied by Graciela Ortega LPN  ??? ? ? ?Blood Pressure Screening on??01/07/20.??Satisfied by Graciela Ortega LPN  ??? ? ? ?Body Mass Index Check on??01/07/20.??Satisfied by Graciela Ortega LPN  ??? ? ? ?Diabetes Screening on??11/07/19.??Satisfied by Melodie Ennis  ??? ? ? ?Hypertension Management-Blood Pressure on??01/07/20.??Satisfied by Graciela Ortega LPN  ??? ? ? ?Influenza Vaccine on??11/07/19.??Satisfied by Bijal BORDEN, Carmen MARX  ??? ? ? ?Lipid Screening on??05/20/19.??Satisfied by Margo Ray  ??? ? ? ?Obesity Screening on??01/07/20.??Satisfied by Graciela Ortega LPN  ?

## 2022-05-01 NOTE — HISTORICAL [facility] CERNER
This is a historical note converted from Isabella. Formatting and pictures may have been removed.  Please reference Ceralena for original formatting and attached multimedia. Admit and Discharge Dates  Admit Date: 06/30/2020  Discharge Date: 07/01/2020  Physicians  Attending Physician - Hernandez GIVENS, Aleksandr HERNANDEZ  Admitting Physician - Hernandez GIVENS, Aleksandr HERNANDEZ  Consulting Physician - Marisol GIVENS, Zana KO  Primary Care Physician - Caridad GIVENS, Jaleel S  Discharge Diagnosis  1.?Fracture of shaft of tibia and fibula?S82.209A  2.?Lower leg pain-swelling?[id number]  3.?HTN (hypertension)?I10  4.?HLD (hyperlipidemia)?E78.5  5.?Rheumatoid arthritis?M06.9  6.?Tobacco user?Z72.0  Surgical Procedures  06/30/2020 - [id number] - Intramedullary Herminio Insertion Tibia  06/30/2020 - [id number] - ORIF Ankle  Immunizations  No immunizations recorded for this visit.  Significant Findings  No perioperative complications.  Objective  Vitals & Measurements  T:?36.9? ?C (Oral)? TMIN:?36.0? ?C (Oral)? TMAX:?37? ?C (Oral)? HR:?99(Peripheral)? RR:?18? BP:?128/80? SpO2:?96%?  Physical Exam  General:?well-developed well-nourished in no acute distress  Eye: EOMI, clear conjunctiva, eyelids normal  HENT:?Normocephalic/atraumatic, oropharynx and nasal mucosal surfaces moist  Neck: full range of motion, no lymphadenopathy  Respiratory:?Non-labored breathing  Cardiovascular:?regular rate and rhythm, no edema  Gastrointestinal:?soft, non-tender, non-distended, without masses to palpation  Genitourinary: no CVA tenderness to palpation  Musculoskeletal:?? Right lower extremity:?Patient is neurovascularly intact to the distal extremity.? Dressings are clean dry and intact with no excessive swelling or erythema noted about the incision areas.? She she has active range of motion of the ankle which is?mildly limited with pain.  Integumentary: no rashes or skin lesions present  Neurologic: cranial nerves intact, no signs of peripheral  neurological deficit, motor/sensory function intact  ?  Patient Discharge Condition  Stable,?no complaints of chest pain calf pain shortness of breath or dyspnea  Discharge Disposition  Patient is discharged to home with home health. ?She is toe-touch weightbearing to the right lower extremity.  She is given a prescription for Percocet 10/325 mg every 6 hours PRN for pain and Duricef 500 mg twice daily for 10 days.? She is instructed to take aspirin 325 mg once daily by mouth for 6 weeks for DVT prophylaxis.  Follow-up with Dr. Ng?in clinic in 2 weeks time. ?The Aquacel dressing over the knee may stay in place until follow-up visit.? The remainder of the lower dressings?may be changed daily or every other day as needed.  She is toe-touch weightbearing?but may perform gentle flexion and extension of the knee.   Discharge Medication Reconciliation  Discharge Med Rec is not complete

## 2022-06-30 ENCOUNTER — TELEPHONE (OUTPATIENT)
Dept: RHEUMATOLOGY | Facility: CLINIC | Age: 47
End: 2022-06-30
Payer: MEDICAID

## 2022-06-30 NOTE — TELEPHONE ENCOUNTER
----- Message from Emma Brasher sent at 6/29/2022  2:48 PM CDT -----  Pt called to get scheduled with Dr Sevilla, pt was seeing Uzma.  Pt # is [phone]

## 2023-02-17 PROBLEM — Z22.7 LATENT TUBERCULOSIS: Status: ACTIVE | Noted: 2023-02-17

## 2023-02-17 PROBLEM — E78.5 HYPERLIPIDEMIA: Status: ACTIVE | Noted: 2023-02-17

## 2023-02-17 PROBLEM — I73.00 RAYNAUD'S PHENOMENON: Status: ACTIVE | Noted: 2023-02-17

## 2023-02-17 PROBLEM — Z79.899 HIGH RISK MEDICATION USE: Status: ACTIVE | Noted: 2023-02-17

## 2023-02-17 PROBLEM — M06.9 RHEUMATOID ARTHRITIS: Status: ACTIVE | Noted: 2023-02-17

## 2023-02-17 PROBLEM — Z72.0 TOBACCO USER: Status: ACTIVE | Noted: 2023-02-17

## 2023-02-17 PROBLEM — F41.8 MIXED ANXIETY DEPRESSIVE DISORDER: Status: ACTIVE | Noted: 2023-02-17

## 2023-02-17 PROBLEM — G44.40 MEDICATION OVERUSE HEADACHE: Status: ACTIVE | Noted: 2023-02-17

## 2023-02-17 PROBLEM — I10 HYPERTENSION: Status: ACTIVE | Noted: 2023-02-17

## 2023-02-17 NOTE — PROGRESS NOTES
Patient ID: [id number]     Chief Complaint: Rheumatoid Arthritis (Having issues walking. Hip w/ shooting pain/Sciatic nerve pain. Using crutches due to pain being so bad. /Right leg hurting a lot lately. Sharp pain from lower back to foot. /Knee and down was shattered from a fall. /Notices she is having a flare up while waiting to take Humira. Has not had it in 8+ months/Notices hands and feet have been changing shape as well/On left foot, has a toe that has a burning sensation on bottom /Has been falling a lot lately. Daily feels like she has no balance. /)       HPI:     Vimal Beasley is a 48 y.o. female here today for a follow up visit for RA.      High titer rheumatoid factor and anti CCP in the past.  She was diagnosed with rheumatoid arthritis around 2017.  She was treated with methotrexate, did not tolerate it because of nausea.  Did not tolerate leflunomide, caused blisters on her leg.  She was treated with Humira in the past, she was not sure if it had helped with joint pain or not, took it for 4-5 months, stopped in summer 2022.  She had seen Dr. Munoz in the past, last rheumatology visit was more than a year back.      Patient is poor historian.  C/o pain on right side of body. C/o sciatica symptoms  for last 3 years, started after fall and fracture of right leg in 2020. C/o pain and burning in legs and right leg. Can not get up and down with her knees. Pain in hips, legs, knees, groin and feet. C/o pain all over her body and neck and shoulders. Using crutches, because of right leg pain. Her balance is off and she has no stability. C/o crepitus in bilateral legs. Pain in knees worse with walking.   Complaining of pain in the toes, deformity of the toes.  Completed treatment for latent TB in the past around 2021.    PMH: HTN, HLD, Anxiety / Depression, vascular migraine headaches.    Denies history of fevers, rashes, photosensitivity, oral or nasal ulcers, h/o MI, stroke, seizures, h/o PE or DVT,  Raynaud's phenomenon, uveitis, malignancies.   Family history of autoimmune disease: Maternal aunt w/ RA, 2 great-aunts w/ RA, 1 first cousin (maternal side) w/RA  Pregnancies:  4 Miscarriages: none.   Smokin pack a day now, used to smoke up to 2 pack a day, smoking since she was 11 years old. More than 50 pack years.   Social History     Tobacco Use   Smoking Status Every Day    Packs/day: 1.00    Types: Cigarettes   Smokeless Tobacco Never        ----------------------------  Depression     History reviewed. No pertinent surgical history.    Review of patient's allergies indicates:  No Known Allergies    Current Outpatient Medications   Medication Instructions    aspirin/salicylamide/caffeine (ARTHRITIS STRENGTH BC POWDER ORAL) Oral, 3 packs in the am and 3 packs in the afternoon    gabapentin (NEURONTIN) 600 mg, Oral, 3 times daily    HUMIRA,CF, PEN 40 mg/0.4 mL PnKt SMARTSI Milligram(s) SUB-Q Every 2 Weeks       Social History     Socioeconomic History    Marital status: Single   Tobacco Use    Smoking status: Every Day     Packs/day: 1.00     Types: Cigarettes    Smokeless tobacco: Never   Substance and Sexual Activity    Alcohol use: Not Currently        Family History   Problem Relation Age of Onset    Hypertension Mother     Diabetes Mellitus Mother     Cancer Mother     Cancer Father     Rheum arthritis Maternal Aunt     Rheum arthritis Maternal Grandmother           There is no immunization history on file for this patient.    Patient Care Team:  Larry Patton MD as PCP - General (Rheumatology)     Subjective:     ROS    Constitutional:  Denies chills. Denies fever. Denies night sweats. Denies weight loss.   Ophthalmology: Denies blurred vision. Denies dry eyes. Denies eye pain. Denies Itching and redness.   ENT: Denies oral ulcers. Denies epistaxis. Denies dry mouth. Denies swollen glands.   Endocrine: Denies diabetes. Denies thyroid Problems.   Respiratory: Denies cough. Denies  "shortness of breath. Denies shortness of breath with exertion. Denies hemoptysis.   Cardiovascular: Denies chest pain at rest. Denies chest pain with exertion. Denies palpitations.    Gastrointestinal: Denies abdominal pain. Denies diarrhea. Denies nausea. Denies vomiting. Denies hematemesis or hematochezia. Denies heartburn.  Genitourinary: Denies blood in urine.  Musculoskeletal: See HPI for details  Integumentary: Denies rash. Denies photosensitivity.   Peripheral Vascular: Denies Ulcers of hands and/or feet. Denies Cold extremities.   Neurologic: Denies dizziness. Denies headache.  Denies loss of strength. Admits numbness or tingling.   Psychiatric: Denies depression. Denies anxiety. Denies suicidal/homicidal ideations.      Objective:     Visit Vitals  /89 (BP Location: Right arm, Patient Position: Sitting)   Pulse 91   Temp 98.3 °F (36.8 °C) (Oral)   Resp 16   Ht 5' 2" (1.575 m)   Wt 58.3 kg (128 lb 9.6 oz)   SpO2 100%   BMI 23.52 kg/m²       Physical Exam    General Appearance: alert, pleasant, in no acute distress.  Skin: Skin color, texture, turgor normal. No rashes or lesions.  Eyes:  extraocular movement intact (EOMI), pupils equal, round, reactive to light and accommodation, conjunctiva clear.  ENT: No oral or nasal ulcers.  Neck:  Neck supple. No adenopathy.   Lungs: CTA bilaterally without crackles, rhonchi, or wheezes.   Heart: RRR w/o murmurs.  No edema. 2+ DP/TP pulse.  Abdomen: Soft, non-tender, no masses, rebound or guarding.  Neuro: Alert, oriented, CN II-XII GI, sensory and motor innervation intact.  Musculoskeletal: DJD changes in bilateral hands. Tenderness with ROM of right hip and right knee. Surgical scar healed on right knee and right shin. No swelling in right knee. Bilateral hallux valgus. No synovitis on exam today, no red, warm and swollen joints.   Psych: Alert, oriented, normal eye contact.      Labs Reviewed:   12/15/2017: . CCP IgG LC >250. WAQAS negative.   11/7/2019: " WAQAS negative.  RNA Poly III negative. Sed Rate 10 (<20). Fibrilliarin, PM/Scl 100, Centromere, RNP, Scler all negative.     Rheumatology:  No results found for: ANAHS, ANATIT, ANAPAT, C3, C4, DNADSAB, CCPANTIBODIE, GKHO60EH, INTER, HSCRP, SEDRATE, LABURIC     Chemistry:  Lab Results   Component Value Date     10/25/2021    K 4.4 10/25/2021    CHLORIDE 108 (H) 10/25/2021    BUN 13.0 10/25/2021    CREATININE 1.10 (H) 10/25/2021    GLUCOSE 94 10/25/2021    CALCIUM 9.9 10/25/2021    ALKPHOS 126 10/25/2021    LABPROT 7.6 10/25/2021    ALBUMIN 3.7 10/25/2021    BILIDIR 0.1 10/25/2021    IBILI 0.00 10/25/2021    AST 25 10/25/2021    ALT 23 10/25/2021    KSRIBNLV78IP 61.5 10/25/2021        Hematology:  Lab Results   Component Value Date    WBC 10.6 10/25/2021    HGB 14.1 10/25/2021    HCT 44.8 10/25/2021     10/25/2021        Urine:  No results found for: COLORUA, APPEARANCEUA, SGUA, PHUA, PROTEINUA, GLUCOSEUA, KETONESUA, BLOODUA, NITRITESUA, LEUKOCYTESUR, RBCUA, WBCUA, BACTERIA, SQEPUA, HYALINECASTS, CREATRANDUR, PROTEINURINE, UPROTCREA     No results found for: PROTEINURINE, CREATRANDUR, UPROTCREA     Imaging:   10/12/2021   XR Foot Left Minimum 3 Views: Degenerative changes. Hallux valgus deformity.  Remote trauma to the fifth metatarsal   10/12/2021  XR Foot Right Minimum 3 Views: Degenerative changes with hallux valgus deformity. Questionable small erosion on the medial aspect by the head of the first metatarsal.   10/12/2021 10:11 CDT XR Hand Right Minimum 3 Views: Degenerative changes.   10/12/2021  XR Hand Left Minimum 3 Views: Minimal degenerative changes.     10/12/2021 XR Chest 2 View: No acute cardiopulmonary process identified.    Assessment:       ICD-10-CM ICD-9-CM   1. Rheumatoid arthritis involving multiple sites with positive rheumatoid factor  M05.79 714.0   2. Latent tuberculosis  Z22.7 795.51   3. Sciatica of right side  M54.31 724.3   4. Raynaud's phenomenon without gangrene  I73.00 443.0    5. Primary hypertension  I10 401.9   6. Tobacco user  Z72.0 305.1   7. Mixed anxiety depressive disorder  F41.8 300.4   8. High risk medication use  Z79.899 V58.69   9. Encounter to establish care  Z76.89 V65.8        Plan:     1. Rheumatoid arthritis involving multiple sites with positive rheumatoid factor  dx 2017. Labs in 2017 show  and CCP >250. Reports having a migratory pattern of joint pain about 17 years ago following her last pregnancy and progressively worsening over the years. She was treated with methotrexate, did not tolerate it because of nausea.  Did not tolerate leflunomide, caused blisters on her leg.  She was treated with Humira in the past, she was not sure if it had helped with joint pain or not, took it for 4-5 months, stopped in summer 2022.  She had seen Dr. Munoz in the past, last rheumatology visit was more than a year back.    - no synovitis on exam today.  Advised to call me when she is having a flare-up, we will re-evaluate.  Need to start on any DMARDs or biologic today.  We will closely monitor.  - labs and x-rays today.  - hallux valgus of bilateral great toes, no tenderness with MTPs.  - advised to cut down on use of BC powder, advised to take Tylenol as needed for joint pain.    2. Latent tuberculosis  Completed 6 months of treatment from 2/21-8/21.    3. Raynaud's phenomenon without gangrene  WAQAS, Scleroderma panel, and centromere ab all negative    4. Primary hypertension  Not taking any medications, blood pressure okay today.  Send referral to family medicine clinic.    5. Tobacco user  Discussed smoking cessation.  She will think about it, not ready to quit smoking yet.    6. Sciatica of right side  - Send referral to physical therapy.  Complaining of pain in right hip, lower back with sciatica symptoms.  We will get x-rays of lumbar spine and hips.  Need to see Orthopedics if symptoms are getting worse.      Follow up in about 4 months (around 6/20/2023). In addition  to their scheduled follow up, the patient has also been instructed to follow up on as needed basis.        Total time spent with patient and documentation is more than 50 minutes. All questions were answered to patient's satisfaction and patient verbalized understanding.

## 2023-02-20 ENCOUNTER — HOSPITAL ENCOUNTER (OUTPATIENT)
Dept: RADIOLOGY | Facility: HOSPITAL | Age: 48
Discharge: HOME | End: 2023-02-20
Attending: INTERNAL MEDICINE
Payer: MEDICAID

## 2023-02-20 ENCOUNTER — OFFICE VISIT (OUTPATIENT)
Dept: RHEUMATOLOGY | Facility: CLINIC | Age: 48
End: 2023-02-20
Payer: MEDICAID

## 2023-02-20 VITALS
OXYGEN SATURATION: 100 % | TEMPERATURE: 98 F | HEART RATE: 91 BPM | RESPIRATION RATE: 16 BRPM | WEIGHT: 128.63 LBS | DIASTOLIC BLOOD PRESSURE: 89 MMHG | BODY MASS INDEX: 23.67 KG/M2 | SYSTOLIC BLOOD PRESSURE: 127 MMHG | HEIGHT: 62 IN

## 2023-02-20 DIAGNOSIS — Z76.89 ENCOUNTER TO ESTABLISH CARE: ICD-10-CM

## 2023-02-20 DIAGNOSIS — M05.79 RHEUMATOID ARTHRITIS INVOLVING MULTIPLE SITES WITH POSITIVE RHEUMATOID FACTOR: ICD-10-CM

## 2023-02-20 DIAGNOSIS — F41.8 MIXED ANXIETY DEPRESSIVE DISORDER: ICD-10-CM

## 2023-02-20 DIAGNOSIS — Z22.7 LATENT TUBERCULOSIS: ICD-10-CM

## 2023-02-20 DIAGNOSIS — Z72.0 TOBACCO USER: ICD-10-CM

## 2023-02-20 DIAGNOSIS — I10 PRIMARY HYPERTENSION: ICD-10-CM

## 2023-02-20 DIAGNOSIS — M54.31 SCIATICA OF RIGHT SIDE: ICD-10-CM

## 2023-02-20 DIAGNOSIS — I73.00 RAYNAUD'S PHENOMENON WITHOUT GANGRENE: ICD-10-CM

## 2023-02-20 DIAGNOSIS — Z79.899 HIGH RISK MEDICATION USE: ICD-10-CM

## 2023-02-20 PROCEDURE — 3074F PR MOST RECENT SYSTOLIC BLOOD PRESSURE < 130 MM HG: ICD-10-PCS | Mod: CPTII,,, | Performed by: INTERNAL MEDICINE

## 2023-02-20 PROCEDURE — 73130 X-RAY EXAM OF HAND: CPT | Mod: TC,RT

## 2023-02-20 PROCEDURE — 73502 X-RAY EXAM HIP UNI 2-3 VIEWS: CPT | Mod: TC,RT

## 2023-02-20 PROCEDURE — 1159F MED LIST DOCD IN RCRD: CPT | Mod: CPTII,,, | Performed by: INTERNAL MEDICINE

## 2023-02-20 PROCEDURE — 99215 OFFICE O/P EST HI 40 MIN: CPT | Mod: S$PBB,,, | Performed by: INTERNAL MEDICINE

## 2023-02-20 PROCEDURE — 3008F BODY MASS INDEX DOCD: CPT | Mod: CPTII,,, | Performed by: INTERNAL MEDICINE

## 2023-02-20 PROCEDURE — 73130 X-RAY EXAM OF HAND: CPT | Mod: TC,LT

## 2023-02-20 PROCEDURE — 73502 X-RAY EXAM HIP UNI 2-3 VIEWS: CPT | Mod: TC,LT

## 2023-02-20 PROCEDURE — 3008F PR BODY MASS INDEX (BMI) DOCUMENTED: ICD-10-PCS | Mod: CPTII,,, | Performed by: INTERNAL MEDICINE

## 2023-02-20 PROCEDURE — 99215 PR OFFICE/OUTPT VISIT, EST, LEVL V, 40-54 MIN: ICD-10-PCS | Mod: S$PBB,,, | Performed by: INTERNAL MEDICINE

## 2023-02-20 PROCEDURE — 73630 X-RAY EXAM OF FOOT: CPT | Mod: TC,RT

## 2023-02-20 PROCEDURE — 72070 X-RAY EXAM THORAC SPINE 2VWS: CPT | Mod: TC

## 2023-02-20 PROCEDURE — 99215 OFFICE O/P EST HI 40 MIN: CPT | Mod: PBBFAC | Performed by: INTERNAL MEDICINE

## 2023-02-20 PROCEDURE — 3074F SYST BP LT 130 MM HG: CPT | Mod: CPTII,,, | Performed by: INTERNAL MEDICINE

## 2023-02-20 PROCEDURE — 73630 X-RAY EXAM OF FOOT: CPT | Mod: TC,LT

## 2023-02-20 PROCEDURE — 3079F PR MOST RECENT DIASTOLIC BLOOD PRESSURE 80-89 MM HG: ICD-10-PCS | Mod: CPTII,,, | Performed by: INTERNAL MEDICINE

## 2023-02-20 PROCEDURE — 3079F DIAST BP 80-89 MM HG: CPT | Mod: CPTII,,, | Performed by: INTERNAL MEDICINE

## 2023-02-20 PROCEDURE — 72110 X-RAY EXAM L-2 SPINE 4/>VWS: CPT | Mod: TC

## 2023-02-20 PROCEDURE — 1159F PR MEDICATION LIST DOCUMENTED IN MEDICAL RECORD: ICD-10-PCS | Mod: CPTII,,, | Performed by: INTERNAL MEDICINE

## 2023-02-22 ENCOUNTER — TELEPHONE (OUTPATIENT)
Dept: RHEUMATOLOGY | Facility: CLINIC | Age: 48
End: 2023-02-22
Payer: MEDICAID

## 2023-02-22 DIAGNOSIS — M87.00 AVASCULAR NECROSIS: Primary | ICD-10-CM

## 2023-02-22 NOTE — TELEPHONE ENCOUNTER
Called pt to let her know the update dr Patton had  Pt was a bit relieved that there is an actual reason for all the pain she has been in.   She will come in Friday to get labs done     She is aware of the referral to ortho and PT.  She knows to be expecting a call.

## 2023-02-22 NOTE — TELEPHONE ENCOUNTER
She did not do the lab work, ask her to have it done ASAP. Right hip X-ray showed DJD and questionable avascular necrosis. Please refer her to ortho hip and ask her to see them As soon as she can. Other X-ray are ok.

## 2023-02-28 ENCOUNTER — TELEPHONE (OUTPATIENT)
Dept: RHEUMATOLOGY | Facility: CLINIC | Age: 48
End: 2023-02-28
Payer: MEDICAID

## 2023-02-28 NOTE — TELEPHONE ENCOUNTER
Called and informed pt to call [facility] medicine @ [phone] to establish care pt verbalized understanding.

## 2023-02-28 NOTE — TELEPHONE ENCOUNTER
----- Message from Oanh Houston MA sent at 2/28/2023  1:23 PM CST -----  Regarding: RE: referral  Patients do not need a referral to establish care just tell them to call [phone] to schedule an appointment.  ----- Message -----  From: Alfie Farrell LPN  Sent: 2/28/2023  12:05 PM CST  To: [facility] Clinical Support Staff  Subject: referral                                         Rheumatology clinic sent a referral please let us know if if accept or denied

## 2023-03-07 ENCOUNTER — TELEPHONE (OUTPATIENT)
Dept: RHEUMATOLOGY | Facility: CLINIC | Age: 48
End: 2023-03-07
Payer: MEDICAID

## 2023-03-07 NOTE — TELEPHONE ENCOUNTER
----- Message from Alfie Farrell LPN sent at 2/28/2023  3:26 PM CST -----  Regarding: Family medicine  Call and check on appt desk see if has appt with family medicine if not call pt

## 2023-06-08 ENCOUNTER — TELEPHONE (OUTPATIENT)
Dept: RHEUMATOLOGY | Facility: CLINIC | Age: 48
End: 2023-06-08
Payer: MEDICAID

## 2023-06-08 ENCOUNTER — OFFICE VISIT (OUTPATIENT)
Dept: FAMILY MEDICINE | Facility: CLINIC | Age: 48
End: 2023-06-08
Payer: MEDICAID

## 2023-06-08 VITALS
BODY MASS INDEX: 22.63 KG/M2 | RESPIRATION RATE: 18 BRPM | HEART RATE: 93 BPM | SYSTOLIC BLOOD PRESSURE: 124 MMHG | HEIGHT: 62 IN | TEMPERATURE: 98 F | DIASTOLIC BLOOD PRESSURE: 89 MMHG | OXYGEN SATURATION: 99 % | WEIGHT: 123 LBS

## 2023-06-08 DIAGNOSIS — F41.9 ANXIETY AND DEPRESSION: Primary | ICD-10-CM

## 2023-06-08 DIAGNOSIS — Z12.31 ENCOUNTER FOR SCREENING MAMMOGRAM FOR BREAST CANCER: ICD-10-CM

## 2023-06-08 DIAGNOSIS — Z12.12 ENCOUNTER FOR COLORECTAL CANCER SCREENING: ICD-10-CM

## 2023-06-08 DIAGNOSIS — M20.11 HALLUX VALGUS OF RIGHT FOOT: ICD-10-CM

## 2023-06-08 DIAGNOSIS — Z72.0 TOBACCO USER: ICD-10-CM

## 2023-06-08 DIAGNOSIS — M25.551 HIP PAIN, CHRONIC, RIGHT: ICD-10-CM

## 2023-06-08 DIAGNOSIS — M79.671 CHRONIC PAIN OF BOTH FEET: ICD-10-CM

## 2023-06-08 DIAGNOSIS — M79.672 CHRONIC PAIN OF BOTH FEET: ICD-10-CM

## 2023-06-08 DIAGNOSIS — M05.79 RHEUMATOID ARTHRITIS INVOLVING MULTIPLE SITES WITH POSITIVE RHEUMATOID FACTOR: ICD-10-CM

## 2023-06-08 DIAGNOSIS — G89.29 HIP PAIN, CHRONIC, RIGHT: ICD-10-CM

## 2023-06-08 DIAGNOSIS — Z76.89 ENCOUNTER TO ESTABLISH CARE: ICD-10-CM

## 2023-06-08 DIAGNOSIS — Z12.11 ENCOUNTER FOR COLORECTAL CANCER SCREENING: ICD-10-CM

## 2023-06-08 DIAGNOSIS — F32.A ANXIETY AND DEPRESSION: Primary | ICD-10-CM

## 2023-06-08 DIAGNOSIS — M54.31 BACK PAIN WITH RIGHT-SIDED SCIATICA: ICD-10-CM

## 2023-06-08 DIAGNOSIS — M87.051 AVASCULAR NECROSIS OF BONE OF RIGHT HIP: ICD-10-CM

## 2023-06-08 DIAGNOSIS — G89.29 CHRONIC PAIN OF BOTH FEET: ICD-10-CM

## 2023-06-08 PROCEDURE — 3074F PR MOST RECENT SYSTOLIC BLOOD PRESSURE < 130 MM HG: ICD-10-PCS | Mod: CPTII,,, | Performed by: NURSE PRACTITIONER

## 2023-06-08 PROCEDURE — 3008F BODY MASS INDEX DOCD: CPT | Mod: CPTII,,, | Performed by: NURSE PRACTITIONER

## 2023-06-08 PROCEDURE — 3079F DIAST BP 80-89 MM HG: CPT | Mod: CPTII,,, | Performed by: NURSE PRACTITIONER

## 2023-06-08 PROCEDURE — 3008F PR BODY MASS INDEX (BMI) DOCUMENTED: ICD-10-PCS | Mod: CPTII,,, | Performed by: NURSE PRACTITIONER

## 2023-06-08 PROCEDURE — 1160F PR REVIEW ALL MEDS BY PRESCRIBER/CLIN PHARMACIST DOCUMENTED: ICD-10-PCS | Mod: CPTII,,, | Performed by: NURSE PRACTITIONER

## 2023-06-08 PROCEDURE — 99214 OFFICE O/P EST MOD 30 MIN: CPT | Mod: S$PBB,,, | Performed by: NURSE PRACTITIONER

## 2023-06-08 PROCEDURE — 99215 OFFICE O/P EST HI 40 MIN: CPT | Mod: PBBFAC | Performed by: NURSE PRACTITIONER

## 2023-06-08 PROCEDURE — 3079F PR MOST RECENT DIASTOLIC BLOOD PRESSURE 80-89 MM HG: ICD-10-PCS | Mod: CPTII,,, | Performed by: NURSE PRACTITIONER

## 2023-06-08 PROCEDURE — 1159F PR MEDICATION LIST DOCUMENTED IN MEDICAL RECORD: ICD-10-PCS | Mod: CPTII,,, | Performed by: NURSE PRACTITIONER

## 2023-06-08 PROCEDURE — 1159F MED LIST DOCD IN RCRD: CPT | Mod: CPTII,,, | Performed by: NURSE PRACTITIONER

## 2023-06-08 PROCEDURE — 3074F SYST BP LT 130 MM HG: CPT | Mod: CPTII,,, | Performed by: NURSE PRACTITIONER

## 2023-06-08 PROCEDURE — 99214 PR OFFICE/OUTPT VISIT, EST, LEVL IV, 30-39 MIN: ICD-10-PCS | Mod: S$PBB,,, | Performed by: NURSE PRACTITIONER

## 2023-06-08 PROCEDURE — 1160F RVW MEDS BY RX/DR IN RCRD: CPT | Mod: CPTII,,, | Performed by: NURSE PRACTITIONER

## 2023-06-08 RX ORDER — DEXTROMETHORPHAN HYDROBROMIDE, GUAIFENESIN 5; 100 MG/5ML; MG/5ML
650 LIQUID ORAL 3 TIMES DAILY PRN
Qty: 60 TABLET | Refills: 3 | Status: SHIPPED | OUTPATIENT
Start: 2023-06-08

## 2023-06-08 RX ORDER — CITALOPRAM 10 MG/1
10 TABLET ORAL DAILY
Qty: 30 TABLET | Refills: 3 | Status: SHIPPED | OUTPATIENT
Start: 2023-06-08

## 2023-06-08 RX ORDER — GABAPENTIN 300 MG/1
300 CAPSULE ORAL NIGHTLY
Qty: 30 CAPSULE | Refills: 3 | Status: SHIPPED | OUTPATIENT
Start: 2023-06-08 | End: 2023-10-11

## 2023-06-08 NOTE — ASSESSMENT & PLAN NOTE
Advised patient to complete blood work and keep her follow-up appointment on Tuesday June 20th at 11:00 a.m. with Rheumatology.  Patient verbalized.

## 2023-06-08 NOTE — ASSESSMENT & PLAN NOTE
Medical records requested.  Discussed relaxation techniques.  Discussed with patient Rx Celexa 10 mg p.o. once daily and to return in 4-6 weeks for follow-up or sooner if needed.  Discussed medication side effects.  Questions solicited and answered.   verbalized and agreed to plan of care.

## 2023-06-08 NOTE — ASSESSMENT & PLAN NOTE
referral to podiatrist Dr. Desir in [place].  Phone number provided.  Take medications as prescribed.  Ice, rest, elevate.

## 2023-06-08 NOTE — ASSESSMENT & PLAN NOTE
Discussed with patient will request medical records from Dr.Yamen Mclean.  Discussed with patient will initiate gabapentin 300 mg p.o. at bedtime, precaution discussed.  Instructed patient to avoid high dose aspirin containing products to prevent any gastrointestinal ulcers and bleed.  Suggested Rx Tylenol Arthritis 650 mg 2 tabs p.o. every 8 hours as needed for pain.  Also discussed referral to orthopedic clinic for right hip pain and questionable avascular necrosis of right hip.  Questions solicited and answered patient verbalized and agreed to plan of care.

## 2023-06-08 NOTE — TELEPHONE ENCOUNTER
Orthopedics here refused referral stating do not take insurance, which I do not understand.   Send extremal referral to ortho in [place], they take medicaid, if patient is willing to see them.     Reason: possible osteonecrosis of right hip.

## 2023-06-08 NOTE — PROGRESS NOTES
Patient Name: Vimal Beasley   : 1975  MRN: [id number]     SUBJECTIVE DATA:    CHIEF COMPLAINT:   Vimal Beasley is a 48 y.o. female who presents to clinic today with Establish Care, Leg Pain, and Anxiety (And Depression)        HPI:  48-year-old female presents to the clinic to establish care.  Previous PCP was Dr. Jaleel Mclean.  Past medical history rheumatoid arthritis, right hip pain, bilateral feet pain anxiety and depression.    Rheumatoid arthritis:  Patient state she has history of rheumatoid arthritis. she used to be be managed here at [facility] as well by her PCP Dr.Yamen Mclean.  Has not had a follow-up for quite some time.  Patient currently not taking any medication for her rheumatoid arthritis.  Patient state she just got established with Rheumatology on 2023. Patient state she has some blood work to be done prior to her next visit with Rheumatology Dr.Supraja Patton.  Advised patient to complete blood work and keep her follow-up appointment on  at 11:00 a.m. with Rheumatology.  Patient verbalized.    Rt hip pain:  Chronic. Patient stated the issue has been going on for quite some time.  Patient state pain started after a fall caused her to have a fracture to her right tibial /fibula with external fixation on 2020.  Also complains of chronic right knee pain specially distal femur due to her fall.  State since then she is been having issue with ambulation and right lower back pain with sciatica.  Patient used to be followed with orthopedic clinic here at [facility] in the past and she was not compliant with her appointments.  Discussed with patient will request medical records from Dr.Yamen Mclean.  Discussed with patient will initiate gabapentin 300 mg p.o. at bedtime, precaution discussed.  Instructed patient to avoid high dose aspirin containing products to prevent any gastrointestinal ulcers and bleed.  Suggested Rx Tylenol Arthritis 650 mg 2 tabs p.o. every 8 hours as needed  "for pain.  Also discussed referral to orthopedic clinic for right hip pain and questionable avascular necrosis of right hip.  Questions solicited and answered patient verbalized and agreed to plan of care.  X-rays reviewed at bedside with patient.  02/20/2023 Rt hip:There is joint space narrowing sclerosis and mild hypertrophic bone formation in the right hip.  There is some subchondral cyst formation seen in the femoral head on the right.  Some sclerosis is seen in the femoral head as well.  Findings are overall significantly worse when compared the prior examination.  Avascular necrosis is suspected.  No fracture is seen.  No dislocation is seen.   02/20/2023 Left hip: No abnormality seen in the left hip.    Bilateral feet pain: chronic.  Discussed with patient referral to podiatrist Dr. Desir in [place] to follow-up on her chronic feet pain.  Patient agreed referral initiated.  Discussed recent x-rays of feet at bedside with patient.  02/20/2023 Left foot: Stable bunion at the distal 1st metatarsal and hallux valgus deformity.  Stable areas of subchondral cyst of the distal 1st metatarsal  02/20/2023 Right foot: Hallux valgus deformity seen with a small subchondral cyst and mild erosion in the distal 1st metatarsal overall unchanged since prior examination.     Anxiety and depression:  PHQ-9 score is 13, carlos-7 score is 12.  Patient states she has history of anxiety and not much of a depression. "Just anxious and worried all the time".  State her depression is due what happened to her since the fall.  Patient state he used to be on multiple medications for her depression anxiety but can not remember the names.  Patient state Dr.Yamen Mclean to manage her anxiety and depression.  Medical records requested.  Discussed with patient Rx Celexa 10 mg p.o. once daily and to return in 4-6 weeks for follow-up or sooner if needed.  Discussed medication side effects.  Questions solicited and answered.  Realized and agreed " "to plan of care.    Patient denies chest pain, shortness of breath, dyspnea on exertion, palpitations, peripheral edema, abdominal pain, nausea, vomiting, diarrhea, constipation, fatigue, fever, chills, dysuria,  hematuria, melena, or hematochezia.     Care gaps:  Patient agreed to complete mammogram, Cologuard.  Patient to return for Pap smear.  Declined immunizations at this time.  Return in 3 months for routine follow-up.    HPI      ALLERGIES: Review of patient's allergies indicates:  No Known Allergies      ROS:  Review of Systems   Musculoskeletal:  Positive for back pain (Right-sided lower back with sciatica.) and joint pain (Right hip, right knee.).   Psychiatric/Behavioral:  Positive for depression. The patient is nervous/anxious.    All other systems reviewed and are negative.      OBJECTIVE DATA:  Vital signs  Vitals:    06/08/23 1002   BP: 124/89   Pulse: 93   Resp: 18   Temp: 97.9 °F (36.6 °C)   TempSrc: Oral   SpO2: 99%   Weight: 55.8 kg (123 lb)   Height: 5' 2" (1.575 m)      Body mass index is 22.5 kg/m².    PHYSICAL EXAM:   Physical Exam  Vitals and nursing note reviewed.   Constitutional:       General: She is awake. She is not in acute distress.     Appearance: Normal appearance. She is well-developed, well-groomed and normal weight. She is not ill-appearing, toxic-appearing or diaphoretic.   HENT:      Head: Normocephalic and atraumatic.      Right Ear: Tympanic membrane, ear canal and external ear normal. There is no impacted cerumen.      Left Ear: Tympanic membrane, ear canal and external ear normal. There is no impacted cerumen.      Nose: Nose normal. No congestion or rhinorrhea.      Right Nostril: No epistaxis.      Left Nostril: No epistaxis.      Right Turbinates: Not swollen.      Left Turbinates: Not swollen.      Right Sinus: No maxillary sinus tenderness or frontal sinus tenderness.      Left Sinus: No maxillary sinus tenderness or frontal sinus tenderness.      Mouth/Throat:      " Lips: Pink.      Mouth: Mucous membranes are moist.      Dentition: Dental caries present.      Tongue: No lesions. Tongue does not deviate from midline.      Palate: No mass and lesions.      Pharynx: Oropharynx is clear. No posterior oropharyngeal erythema.   Eyes:      General: Lids are normal. Gaze aligned appropriately. No scleral icterus.     Extraocular Movements: Extraocular movements intact.      Conjunctiva/sclera: Conjunctivae normal.      Pupils: Pupils are equal, round, and reactive to light.   Neck:      Trachea: Trachea and phonation normal.   Cardiovascular:      Rate and Rhythm: Normal rate and regular rhythm.      Pulses: Normal pulses.           Radial pulses are 2+ on the right side and 2+ on the left side.        Dorsalis pedis pulses are 2+ on the right side and 2+ on the left side.        Posterior tibial pulses are 2+ on the right side and 2+ on the left side.      Heart sounds: Normal heart sounds. No murmur heard.  Pulmonary:      Effort: Pulmonary effort is normal.      Breath sounds: Normal breath sounds and air entry. No wheezing or rhonchi.   Abdominal:      General: Abdomen is flat.      Palpations: Abdomen is soft.      Tenderness: There is no abdominal tenderness. There is no right CVA tenderness or left CVA tenderness.   Musculoskeletal:         General: Normal range of motion.      Cervical back: Normal range of motion and neck supple. No rigidity or tenderness.        Feet:    Feet:      Right foot:      Skin integrity: Skin integrity normal.      Left foot:      Skin integrity: Skin integrity normal.      Comments: Tender to touch.  No crepitus , step-off.  Chronic deformity noted.  Lymphadenopathy:      Cervical: No cervical adenopathy.   Skin:     General: Skin is warm and dry.      Capillary Refill: Capillary refill takes less than 2 seconds.   Neurological:      General: No focal deficit present.      Mental Status: She is alert and oriented to person, place, and time. Mental  status is at baseline.      GCS: GCS eye subscore is 4. GCS verbal subscore is 5. GCS motor subscore is 6.      Cranial Nerves: Cranial nerves 2-12 are intact.      Sensory: Sensation is intact.      Motor: Motor function is intact.      Coordination: Coordination is intact.      Gait: Gait abnormal (chronic right hip, right lower back, right knee pain.).   Psychiatric:         Attention and Perception: Attention and perception normal.         Mood and Affect: Mood and affect normal.         Speech: Speech normal.         Behavior: Behavior normal. Behavior is cooperative.         Thought Content: Thought content normal. Thought content does not include homicidal or suicidal ideation.         Cognition and Memory: Cognition and memory normal.         Judgment: Judgment normal.        ASSESSMENT/PLAN:  1. Anxiety and depression  Assessment & Plan:  Medical records requested.  Discussed relaxation techniques.  Discussed with patient Rx Celexa 10 mg p.o. once daily and to return in 4-6 weeks for follow-up or sooner if needed.  Discussed medication side effects.  Questions solicited and answered.   verbalized and agreed to plan of care.    Orders:  -     citalopram (CELEXA) 10 MG tablet; Take 1 tablet (10 mg total) by mouth once daily.  Dispense: 30 tablet; Refill: 3    2. Avascular necrosis of bone of right hip  -     Ambulatory referral/consult to Orthopedics; Future; Expected date: 06/15/2023    3. Hip pain, chronic, right  -     acetaminophen (TYLENOL) 650 MG TbSR; Take 1 tablet (650 mg total) by mouth 3 (three) times daily as needed (pain).  Dispense: 60 tablet; Refill: 3  -     Ambulatory referral/consult to Orthopedics; Future; Expected date: 06/15/2023    4. Hallux valgus of right foot  -     Ambulatory referral/consult to Podiatry; Future; Expected date: 06/15/2023    5. Back pain with right-sided sciatica  Assessment & Plan:   Discussed with patient will request medical records from Dr.Yamen Mclean.  Discussed  with patient will initiate gabapentin 300 mg p.o. at bedtime, precaution discussed.  Instructed patient to avoid high dose aspirin containing products to prevent any gastrointestinal ulcers and bleed.  Suggested Rx Tylenol Arthritis 650 mg 2 tabs p.o. every 8 hours as needed for pain.  Also discussed referral to orthopedic clinic for right hip pain and questionable avascular necrosis of right hip.  Questions solicited and answered patient verbalized and agreed to plan of care.    Orders:  -     gabapentin (NEURONTIN) 300 MG capsule; Take 1 capsule (300 mg total) by mouth every evening.  Dispense: 30 capsule; Refill: 3  -     acetaminophen (TYLENOL) 650 MG TbSR; Take 1 tablet (650 mg total) by mouth 3 (three) times daily as needed (pain).  Dispense: 60 tablet; Refill: 3    6. Chronic pain of both feet  Assessment & Plan:  referral to podiatrist Dr. Desir in [place].  Phone number provided.  Take medications as prescribed.  Ice, rest, elevate.    Orders:  -     Ambulatory referral/consult to Podiatry; Future; Expected date: 06/15/2023    7. Encounter to establish care  -     gabapentin (NEURONTIN) 300 MG capsule; Take 1 capsule (300 mg total) by mouth every evening.  Dispense: 30 capsule; Refill: 3  -     acetaminophen (TYLENOL) 650 MG TbSR; Take 1 tablet (650 mg total) by mouth 3 (three) times daily as needed (pain).  Dispense: 60 tablet; Refill: 3  -     Mammo Digital Screening Bilat w/ Pranav; Future; Expected date: 06/08/2023  -     Cologuard Screening (Multitarget Stool DNA); Future; Expected date: 06/08/2023  -     Ambulatory referral/consult to Orthopedics; Future; Expected date: 06/15/2023  -     Ambulatory referral/consult to Podiatry; Future; Expected date: 06/15/2023  -     citalopram (CELEXA) 10 MG tablet; Take 1 tablet (10 mg total) by mouth once daily.  Dispense: 30 tablet; Refill: 3    8. Encounter for screening mammogram for breast cancer  -     Mammo Digital Screening Bilat w/ Pranav; Future; Expected  date: 06/08/2023    9. Encounter for colorectal cancer screening  -     Cologuard Screening (Multitarget Stool DNA); Future; Expected date: 06/08/2023    10. Tobacco user    11. Rheumatoid arthritis involving multiple sites with positive rheumatoid factor  Assessment & Plan:  Advised patient to complete blood work and keep her follow-up appointment on Tuesday June 20th at 11:00 a.m. with Rheumatology.  Patient verbalized.             RESULTS:  No results found for this or any previous visit (from the past 1008 hour(s)).      Follow Up:  Follow up in about 3 months (around 9/8/2023).      Previous medical history/lab work/radiology reviewed and considered during medical management decisions.   Medication list reviewed and medication reconciliation performed.  Patient was provided  and care about his/her current diagnosis (es) and medications including risk/benefit and side effects/adverse events, over the counter medication uses/doses, home self-care and contact precautions,  and red flags and indications for when to seek immediate medical attention.   Patient was advised to continue compliance with current medication list and medical recommendations.  Patient dvised continued compliance with recommended eating habits/ diets for medical conditions and exercise 150 minutes/ week (if possible) for medical condition (s).  Educational handouts and instructions on selected disease management in AVS (After Visit Summary).    All of the patient's questions were answered to patient's satisfaction.   The patient was receptive, expressed verbal understanding and agreement the above plan.     This note was created with the assistance of a voice recognition software or phone dictation. There may be transcription errors as a result of using this technology however minimal. Effort has been made to assure accuracy of transcription but any obvious errors or omissions should be clarified with the author of the document

## 2023-06-08 NOTE — TELEPHONE ENCOUNTER
"----- Message from JOE Potter sent at 6/8/2023 10:41 AM CDT -----  Regarding: Cancellation of Order # [id number]  Order number [id number] for the procedure AMB REFERRAL/CONSULT TO   ORTHOPEDICS [[id number]] has been canceled by JOE Potter   [[id number]]. This procedure was ordered by Larry Patton MD   [[id number]] on Feb 22, 2023 for the patient Vimal Beasley [[id number]].   The reason for cancellation was "Other".    This was a future order.  "

## 2023-06-09 DIAGNOSIS — G89.29 HIP PAIN, CHRONIC, RIGHT: Primary | ICD-10-CM

## 2023-06-09 DIAGNOSIS — M25.551 HIP PAIN, CHRONIC, RIGHT: Primary | ICD-10-CM

## 2023-06-20 ENCOUNTER — OFFICE VISIT (OUTPATIENT)
Dept: RHEUMATOLOGY | Facility: CLINIC | Age: 48
End: 2023-06-20
Payer: MEDICAID

## 2023-06-20 VITALS
RESPIRATION RATE: 18 BRPM | HEART RATE: 96 BPM | HEIGHT: 62 IN | WEIGHT: 126 LBS | BODY MASS INDEX: 23.19 KG/M2 | DIASTOLIC BLOOD PRESSURE: 102 MMHG | SYSTOLIC BLOOD PRESSURE: 150 MMHG | TEMPERATURE: 98 F

## 2023-06-20 DIAGNOSIS — M15.9 PRIMARY OSTEOARTHRITIS INVOLVING MULTIPLE JOINTS: ICD-10-CM

## 2023-06-20 DIAGNOSIS — M87.00 AVASCULAR NECROSIS: ICD-10-CM

## 2023-06-20 DIAGNOSIS — Z72.0 TOBACCO USER: ICD-10-CM

## 2023-06-20 DIAGNOSIS — F41.8 MIXED ANXIETY DEPRESSIVE DISORDER: ICD-10-CM

## 2023-06-20 DIAGNOSIS — M54.31 SCIATICA OF RIGHT SIDE: ICD-10-CM

## 2023-06-20 DIAGNOSIS — I10 PRIMARY HYPERTENSION: ICD-10-CM

## 2023-06-20 DIAGNOSIS — M05.79 RHEUMATOID ARTHRITIS INVOLVING MULTIPLE SITES WITH POSITIVE RHEUMATOID FACTOR: Primary | ICD-10-CM

## 2023-06-20 DIAGNOSIS — Z22.7 LATENT TUBERCULOSIS: ICD-10-CM

## 2023-06-20 DIAGNOSIS — I73.00 RAYNAUD'S PHENOMENON WITHOUT GANGRENE: ICD-10-CM

## 2023-06-20 PROCEDURE — 3008F PR BODY MASS INDEX (BMI) DOCUMENTED: ICD-10-PCS | Mod: CPTII,,, | Performed by: INTERNAL MEDICINE

## 2023-06-20 PROCEDURE — 99214 OFFICE O/P EST MOD 30 MIN: CPT | Mod: PBBFAC | Performed by: INTERNAL MEDICINE

## 2023-06-20 PROCEDURE — 99214 OFFICE O/P EST MOD 30 MIN: CPT | Mod: S$PBB,,, | Performed by: INTERNAL MEDICINE

## 2023-06-20 PROCEDURE — 3077F SYST BP >= 140 MM HG: CPT | Mod: CPTII,,, | Performed by: INTERNAL MEDICINE

## 2023-06-20 PROCEDURE — 3008F BODY MASS INDEX DOCD: CPT | Mod: CPTII,,, | Performed by: INTERNAL MEDICINE

## 2023-06-20 PROCEDURE — 1159F PR MEDICATION LIST DOCUMENTED IN MEDICAL RECORD: ICD-10-PCS | Mod: CPTII,,, | Performed by: INTERNAL MEDICINE

## 2023-06-20 PROCEDURE — 1159F MED LIST DOCD IN RCRD: CPT | Mod: CPTII,,, | Performed by: INTERNAL MEDICINE

## 2023-06-20 PROCEDURE — 99214 PR OFFICE/OUTPT VISIT, EST, LEVL IV, 30-39 MIN: ICD-10-PCS | Mod: S$PBB,,, | Performed by: INTERNAL MEDICINE

## 2023-06-20 PROCEDURE — 3080F DIAST BP >= 90 MM HG: CPT | Mod: CPTII,,, | Performed by: INTERNAL MEDICINE

## 2023-06-20 PROCEDURE — 3080F PR MOST RECENT DIASTOLIC BLOOD PRESSURE >= 90 MM HG: ICD-10-PCS | Mod: CPTII,,, | Performed by: INTERNAL MEDICINE

## 2023-06-20 PROCEDURE — 3077F PR MOST RECENT SYSTOLIC BLOOD PRESSURE >= 140 MM HG: ICD-10-PCS | Mod: CPTII,,, | Performed by: INTERNAL MEDICINE

## 2023-06-20 NOTE — PROGRESS NOTES
Patient ID: [id number]     Chief Complaint: Rheumatoid Arthritis (Pt states she has joint swelling, bottom of her feet hurling, and she is dropping items. States she has morning stiffness. )       HPI:     Vimal Beasley is a 48 y.o. female here today for a follow up visit for RA.      High titer rheumatoid factor and anti CCP in the past.  She was diagnosed with rheumatoid arthritis around .  She was treated with methotrexate, did not tolerate it because of nausea.  Did not tolerate leflunomide, caused blisters on her leg.  She was treated with Humira in the past, she was not sure if it had helped with joint pain or not, took it for 4-5 months, stopped in summer 2022.  She had seen Dr. Munoz in the past.    Patient is poor historian.  C/o pain on right side of body. C/o sciatica symptoms  for last 3 years, started after fall and fracture of right leg in . C/o pain and burning in legs and right leg. Can not get up and down with her knees. Pain in hips, legs, knees, groin and feet. C/o pain all over her body and neck and shoulders. Using crutches, because of right leg pain. Her balance is off and she has no stability. C/o crepitus in bilateral legs. Pain in knees worse with walking.   C/o severe pain in hip, hard to ambulate because of that. X-ray showed osteonecrosis of hip, sent referral to ortho, waiting for appointment.   Complaining of pain in the toes, deformity of the toes.  Completed treatment for latent TB in the past around .    PMH: HTN, HLD, Anxiety / Depression, vascular migraine headaches.    Denies history of fevers, rashes, photosensitivity, oral or nasal ulcers, h/o MI, stroke, seizures, h/o PE or DVT, Raynaud's phenomenon, uveitis, malignancies.   Family history of autoimmune disease: Maternal aunt w/ RA, 2 great-aunts w/ RA, 1 first cousin (maternal side) w/RA  Pregnancies:  4 Miscarriages: none.   Smokin pack a day now, used to smoke up to 2 pack a day, smoking since she was 11  years old. More than 50 pack years.     Social History     Tobacco Use   Smoking Status Every Day    Packs/day: 1.00    Types: Cigarettes   Smokeless Tobacco Never        ----------------------------  Arthritis  Depression     Past Surgical History:   Procedure Laterality Date     SECTION      Leg Fracture repair         Review of patient's allergies indicates:  No Known Allergies    Current Outpatient Medications   Medication Instructions    acetaminophen (TYLENOL) 650 mg, Oral, 3 times daily PRN    aspirin/salicylamide/caffeine (ARTHRITIS STRENGTH BC POWDER ORAL) Oral, 3 packs in the am and 3 packs in the afternoon    citalopram (CELEXA) 10 mg, Oral, Daily    gabapentin (NEURONTIN) 300 mg, Oral, Nightly       Social History     Socioeconomic History    Marital status: Single   Tobacco Use    Smoking status: Every Day     Packs/day: 1.00     Types: Cigarettes    Smokeless tobacco: Never   Substance and Sexual Activity    Alcohol use: Not Currently    Drug use: Never    Sexual activity: Yes     Social Determinants of Health     Financial Resource Strain: Low Risk     Difficulty of Paying Living Expenses: Not very hard   Food Insecurity: No Food Insecurity    Worried About Running Out of Food in the Last Year: Never true    Ran Out of Food in the Last Year: Never true   Transportation Needs: Unmet Transportation Needs    Lack of Transportation (Medical): Yes    Lack of Transportation (Non-Medical): No   Physical Activity: Inactive    Days of Exercise per Week: 0 days    Minutes of Exercise per Session: 0 min   Stress: Stress Concern Present    Feeling of Stress : Rather much   Social Connections: Moderately Integrated    Frequency of Communication with Friends and Family: Twice a week    Frequency of Social Gatherings with Friends and Family: Twice a week    Attends Hinduism Services: 1 to 4 times per year    Active Member of Clubs or Organizations: No    Attends Club or  Organization Meetings: 1 to 4 times per year    Marital Status: Never    Housing Stability: Low Risk     Unable to Pay for Housing in the Last Year: No    Number of Places Lived in the Last Year: 1    Unstable Housing in the Last Year: No        Family History   Problem Relation Age of Onset    Hypertension Mother     Diabetes Mellitus Mother     Cancer Mother     Cancer Father     Rheum arthritis Maternal Aunt     Rheum arthritis Maternal Grandmother         Immunization History   Administered Date(s) Administered    Influenza - Trivalent - PF (ADULT) 11/07/2019, 10/29/2021       Patient Care Team:  JOE Potter as PCP - General (Family Medicine)     Subjective:     ROS    Constitutional:  Denies chills. Denies fever. Denies night sweats. Denies weight loss.   Ophthalmology: Denies blurred vision. Denies dry eyes. Denies eye pain. Denies Itching and redness.   ENT: Denies oral ulcers. Denies epistaxis. Denies dry mouth. Denies swollen glands.   Endocrine: Denies diabetes. Denies thyroid Problems.   Respiratory: Denies cough. Denies shortness of breath. Denies shortness of breath with exertion. Denies hemoptysis.   Cardiovascular: Denies chest pain at rest. Denies chest pain with exertion. Denies palpitations.    Gastrointestinal: Denies abdominal pain. Denies diarrhea. Denies nausea. Denies vomiting. Denies hematemesis or hematochezia. Denies heartburn.  Genitourinary: Denies blood in urine.  Musculoskeletal: See HPI for details  Integumentary: Denies rash. Denies photosensitivity.   Peripheral Vascular: Denies Ulcers of hands and/or feet. Denies Cold extremities.   Neurologic: Denies dizziness. Denies headache.  Denies loss of strength. Admits numbness or tingling.   Psychiatric: Denies depression. Denies anxiety. Denies suicidal/homicidal ideations.      Objective:     Visit Vitals  BP (!) 150/102 (BP Location: Left arm, Patient Position: Sitting, BP Method: Small (Manual))   Pulse 96  "  Temp 98.1 °F (36.7 °C) (Oral)   Resp 18   Ht 5' 2" (1.575 m)   Wt 57.2 kg (126 lb)   LMP  (LMP Unknown)   BMI 23.05 kg/m²       Physical Exam    General Appearance: alert, pleasant, in no acute distress.  Skin: Skin color, texture, turgor normal. No rashes or lesions.  Eyes:  extraocular movement intact (EOMI), pupils equal, round, reactive to light and accommodation, conjunctiva clear.  ENT: No oral or nasal ulcers.  Neck:  Neck supple. No adenopathy.   Lungs: CTA bilaterally without crackles, rhonchi, or wheezes.   Heart: RRR w/o murmurs.  No edema. 2+ DP/TP pulse.  Abdomen: Soft, non-tender, no masses, rebound or guarding.  Neuro: Alert, oriented, CN II-XII GI, sensory and motor innervation intact.  Musculoskeletal: DJD changes in bilateral hands. Tenderness with ROM of right hip and right knee. Surgical scar healed on right knee and right shin. No swelling in right knee. Bilateral hallux valgus. No synovitis on exam today, no red, warm and swollen joints.   Psych: Alert, oriented, normal eye contact.      Labs Reviewed:   12/15/2017: . CCP IgG LC >250. WAQAS negative.   11/7/2019: WAQAS negative.  RNA Poly III negative. Sed Rate 10 (<20). Fibrilliarin, PM/Scl 100, Centromere, RNP, Scler all negative.     Rheumatology:  No results found for: ANAHS, ANATIT, ANAPAT, C3, C4, DNADSAB, CCPANTIBODIE, CZON57YL, INTER, HSCRP, SEDRATE, LABURIC     Chemistry:  Lab Results   Component Value Date     10/25/2021    K 4.4 10/25/2021    CHLORIDE 108 (H) 10/25/2021    BUN 13.0 10/25/2021    CREATININE 1.10 (H) 10/25/2021    GLUCOSE 94 10/25/2021    CALCIUM 9.9 10/25/2021    ALKPHOS 126 10/25/2021    LABPROT 7.6 10/25/2021    ALBUMIN 3.7 10/25/2021    BILIDIR 0.1 10/25/2021    IBILI 0.00 10/25/2021    AST 25 10/25/2021    ALT 23 10/25/2021    VUGMYDHB61MK 61.5 10/25/2021        Hematology:  Lab Results   Component Value Date    WBC 10.6 10/25/2021    HGB 14.1 10/25/2021    HCT 44.8 10/25/2021     10/25/2021    "     Urine:  No results found for: COLORUA, APPEARANCEUA, SGUA, PHUA, PROTEINUA, GLUCOSEUA, KETONESUA, BLOODUA, NITRITESUA, LEUKOCYTESUR, RBCUA, WBCUA, BACTERIA, SQEPUA, HYALINECASTS, CREATRANDUR, PROTEINURINE, UPROTCREA     No results found for: PROTEINURINE, CREATRANDUR, UPROTCREA     Imaging:   10/12/2021   XR Foot Left Minimum 3 Views: Degenerative changes. Hallux valgus deformity.  Remote trauma to the fifth metatarsal   10/12/2021  XR Foot Right Minimum 3 Views: Degenerative changes with hallux valgus deformity. Questionable small erosion on the medial aspect by the head of the first metatarsal.   10/12/2021 10:11 CDT XR Hand Right Minimum 3 Views: Degenerative changes.   10/12/2021  XR Hand Left Minimum 3 Views: Minimal degenerative changes.   10/12/2021 XR Chest 2 View: No acute cardiopulmonary process identified.    2/20/23:  X-ray of thoracic spine showed mild DJD changes, multilevel small marginal osteophytes. Hallux valgus bilaterally, old 5th meratarsal fracture on left. DJD changes in bilateral hands and feet stable compared to 2021.  The pelvic view shows joint space narrowing sclerosis and mild hypertrophic bone formation in the right hip joint.  There is some subchondral cysts seen in the femoral head on the right side as well.  Findings are significantly worse when compared to the prior examination.  The left hip appears grossly unremarkable with no evidence of significant degenerative changes or of acute fracture or dislocation is seen.Multi level DJD of lumbar spine.       Assessment:       ICD-10-CM ICD-9-CM   1. Rheumatoid arthritis involving multiple sites with positive rheumatoid factor  M05.79 714.0   2. Avascular necrosis  M87.00 733.40   3. Primary osteoarthritis involving multiple joints  M15.9 715.98   4. Latent tuberculosis  Z22.7 795.51   5. Sciatica of right side  M54.31 724.3   6. Raynaud's phenomenon without gangrene  I73.00 443.0   7. Primary hypertension  I10 401.9   8. Tobacco  user  Z72.0 305.1   9. Mixed anxiety depressive disorder  F41.8 300.4          Plan:     1. Rheumatoid arthritis involving multiple sites with positive rheumatoid factor  dx 2017. Labs in 2017 show  and CCP >250. Reports having a migratory pattern of joint pain about 17 years ago following her last pregnancy and progressively worsening over the years. She was treated with methotrexate, did not tolerate it because of nausea.  Did not tolerate leflunomide, caused blisters on her leg.  She was treated with Humira in the past, she was not sure if it had helped with joint pain or not, took it for 4-5 months, stopped in summer 2022.  She had seen Dr. Munoz in the past.  - no synovitis on exam today.  Advised to call me when she is having a flare-up, we will re-evaluate.  Need to start on any DMARDs or biologic today.  We will closely monitor.  - hallux valgus of bilateral great toes, no tenderness with MTPs.  - advised to cut down on use of BC powder, advised to take Tylenol as needed for joint pain.  - X-rays showed generalized DJD.    2. Latent tuberculosis  Completed 6 months of treatment from 2/21-8/21.    3. Raynaud's phenomenon without gangrene  WAQAS, Scleroderma panel, and centromere ab all negative    4. Primary hypertension  Not taking any medications, blood pressure elevated.  Sent referral to family medicine clinic, follow up with Mr Kira DIAZ.     5. Tobacco user  Discussed smoking cessation.  She will think about it, not ready to quit smoking yet.    6. Sciatica of right side  X-ray showed DJD of spine, questionable osteonecrosis of right hip.  Send referral to Orthopedics, waiting for appointment.      Follow up in about 6 months (around 12/20/2023). In addition to their scheduled follow up, the patient has also been instructed to follow up on as needed basis.        Total time spent with patient and documentation is more than 30 minutes. All questions were answered to patient's satisfaction and  patient verbalized understanding.

## 2023-06-21 ENCOUNTER — TELEPHONE (OUTPATIENT)
Dept: RHEUMATOLOGY | Facility: CLINIC | Age: 48
End: 2023-06-21
Payer: MEDICAID

## 2023-06-21 NOTE — TELEPHONE ENCOUNTER
Dr. Luis [phone] ([phone]) [phone].    PT aware that if this MD will to accept referral, [facility] or [place] will be the only options.  PT verbalized understanding.

## 2023-07-25 ENCOUNTER — TELEPHONE (OUTPATIENT)
Dept: RHEUMATOLOGY | Facility: CLINIC | Age: 48
End: 2023-07-25
Payer: MEDICAID

## 2023-07-25 NOTE — TELEPHONE ENCOUNTER
----- Message from Emma Brasher sent at 7/25/2023 10:55 AM CDT -----  Pt called stating that she has been having flare up for about 3 wks and she would like a sooner appt. Please advise  Pt # [phone]

## 2023-08-04 PROBLEM — M87.00 AVASCULAR NECROSIS: Status: ACTIVE | Noted: 2023-06-08

## 2023-10-11 DIAGNOSIS — M54.31 BACK PAIN WITH RIGHT-SIDED SCIATICA: ICD-10-CM

## 2023-10-11 DIAGNOSIS — Z76.89 ENCOUNTER TO ESTABLISH CARE: ICD-10-CM

## 2023-10-11 RX ORDER — GABAPENTIN 300 MG/1
300 CAPSULE ORAL NIGHTLY
Qty: 30 CAPSULE | Refills: 3 | Status: SHIPPED | OUTPATIENT
Start: 2023-10-11

## 2023-12-08 ENCOUNTER — TELEPHONE (OUTPATIENT)
Dept: FAMILY MEDICINE | Facility: CLINIC | Age: 48
End: 2023-12-08
Payer: MEDICAID

## 2024-02-26 ENCOUNTER — HOSPITAL ENCOUNTER (OUTPATIENT)
Dept: RADIOLOGY | Facility: HOSPITAL | Age: 49
Discharge: HOME | End: 2024-02-26
Attending: INTERNAL MEDICINE
Payer: MEDICAID

## 2024-02-26 ENCOUNTER — TELEPHONE (OUTPATIENT)
Dept: RHEUMATOLOGY | Facility: CLINIC | Age: 49
End: 2024-02-26
Payer: MEDICAID

## 2024-02-26 ENCOUNTER — OFFICE VISIT (OUTPATIENT)
Dept: RHEUMATOLOGY | Facility: CLINIC | Age: 49
End: 2024-02-26
Payer: MEDICAID

## 2024-02-26 VITALS
BODY MASS INDEX: 25.98 KG/M2 | TEMPERATURE: 98 F | HEIGHT: 62 IN | HEART RATE: 94 BPM | OXYGEN SATURATION: 98 % | RESPIRATION RATE: 18 BRPM | WEIGHT: 141.19 LBS | SYSTOLIC BLOOD PRESSURE: 134 MMHG | DIASTOLIC BLOOD PRESSURE: 88 MMHG

## 2024-02-26 DIAGNOSIS — F41.8 MIXED ANXIETY DEPRESSIVE DISORDER: ICD-10-CM

## 2024-02-26 DIAGNOSIS — F17.210 NICOTINE DEPENDENCE, CIGARETTES, UNCOMPLICATED: ICD-10-CM

## 2024-02-26 DIAGNOSIS — Z22.7 LATENT TUBERCULOSIS: ICD-10-CM

## 2024-02-26 DIAGNOSIS — M47.812 CERVICAL SPINE ARTHRITIS: ICD-10-CM

## 2024-02-26 DIAGNOSIS — M05.79 RHEUMATOID ARTHRITIS INVOLVING MULTIPLE SITES WITH POSITIVE RHEUMATOID FACTOR: Primary | ICD-10-CM

## 2024-02-26 DIAGNOSIS — I73.00 RAYNAUD'S PHENOMENON WITHOUT GANGRENE: ICD-10-CM

## 2024-02-26 DIAGNOSIS — I10 PRIMARY HYPERTENSION: ICD-10-CM

## 2024-02-26 DIAGNOSIS — M05.79 RHEUMATOID ARTHRITIS INVOLVING MULTIPLE SITES WITH POSITIVE RHEUMATOID FACTOR: ICD-10-CM

## 2024-02-26 DIAGNOSIS — M15.9 PRIMARY OSTEOARTHRITIS INVOLVING MULTIPLE JOINTS: ICD-10-CM

## 2024-02-26 DIAGNOSIS — M54.31 SCIATICA OF RIGHT SIDE: ICD-10-CM

## 2024-02-26 DIAGNOSIS — M87.00 AVASCULAR NECROSIS: ICD-10-CM

## 2024-02-26 PROCEDURE — 73030 X-RAY EXAM OF SHOULDER: CPT | Mod: TC,RT

## 2024-02-26 PROCEDURE — 73030 X-RAY EXAM OF SHOULDER: CPT | Mod: TC,LT

## 2024-02-26 PROCEDURE — 3079F DIAST BP 80-89 MM HG: CPT | Mod: CPTII,,, | Performed by: INTERNAL MEDICINE

## 2024-02-26 PROCEDURE — 1159F MED LIST DOCD IN RCRD: CPT | Mod: CPTII,,, | Performed by: INTERNAL MEDICINE

## 2024-02-26 PROCEDURE — 72052 X-RAY EXAM NECK SPINE 6/>VWS: CPT | Mod: TC

## 2024-02-26 PROCEDURE — 99406 BEHAV CHNG SMOKING 3-10 MIN: CPT | Mod: S$PBB,,, | Performed by: INTERNAL MEDICINE

## 2024-02-26 PROCEDURE — 99215 OFFICE O/P EST HI 40 MIN: CPT | Mod: PBBFAC,25 | Performed by: INTERNAL MEDICINE

## 2024-02-26 PROCEDURE — 99214 OFFICE O/P EST MOD 30 MIN: CPT | Mod: S$PBB,25,, | Performed by: INTERNAL MEDICINE

## 2024-02-26 PROCEDURE — 3075F SYST BP GE 130 - 139MM HG: CPT | Mod: CPTII,,, | Performed by: INTERNAL MEDICINE

## 2024-02-26 PROCEDURE — 3008F BODY MASS INDEX DOCD: CPT | Mod: CPTII,,, | Performed by: INTERNAL MEDICINE

## 2024-02-26 RX ORDER — HYDROXYCHLOROQUINE SULFATE 200 MG/1
300 TABLET, FILM COATED ORAL DAILY
Qty: 60 TABLET | Refills: 5 | Status: SHIPPED | OUTPATIENT
Start: 2024-02-26

## 2024-02-26 NOTE — PROGRESS NOTES
Patient ID: [id number]     Chief Complaint: Rheumatoid arthritis involving multiple sites with positive (Patient is complaining of swelling and joint stiffness to her bilateral hands also states she is having numbness feeling along with weakness. States over all she is having muscle weakness. )       HPI:     Vimal Beasley is a 49 y.o. female here today for a follow up visit for RA.      High titer rheumatoid factor and anti CCP in the past.  She was diagnosed with rheumatoid arthritis around .  She was treated with methotrexate, did not tolerate it because of nausea.  Did not tolerate leflunomide, caused blisters on her leg.  She was treated with Humira in the past, she was not sure if it had helped with joint pain or not, took it for 4-5 months, stopped in summer 2022.  She had seen Dr. Munoz in the past.    She finally had right hip replacement for avascular necrosis, she is walking without crutches now.  Right hip pain is much better.  Patient is poor historian.  C/o pain all over her body and neck and shoulders. C/o crepitus in bilateral legs. Pain in knees worse with walking.   Complaining of pain in the toes, deformity of the toes.  Completed treatment for latent TB in the past around .    PMH: HTN, HLD, Anxiety / Depression, vascular migraine headaches.    Denies history of fevers, rashes, photosensitivity, oral or nasal ulcers, h/o MI, stroke, seizures, h/o PE or DVT, Raynaud's phenomenon, uveitis, malignancies.   Family history of autoimmune disease: Maternal aunt w/ RA, 2 great-aunts w/ RA, 1 first cousin (maternal side) w/RA  Pregnancies:  4 Miscarriages: none.   Smokin pack a day now, used to smoke up to 2 pack a day, smoking since she was 11 years old. More than 50 pack years.     Social History     Tobacco Use   Smoking Status Every Day    Current packs/day: 1.00    Types: Cigarettes   Smokeless Tobacco Never        ----------------------------  Arthritis  Depression     Past Surgical  History:   Procedure Laterality Date     SECTION      hip replacment Right     Leg Fracture repair         Review of patient's allergies indicates:  No Known Allergies    Current Outpatient Medications   Medication Instructions    acetaminophen (TYLENOL) 650 mg, Oral, 3 times daily PRN    aspirin/salicylamide/caffeine (ARTHRITIS STRENGTH BC POWDER ORAL) Oral, 3 packs in the am and 3 packs in the afternoon    citalopram (CELEXA) 10 mg, Oral, Daily    gabapentin (NEURONTIN) 300 mg, Oral, Nightly    hydroxychloroquine (PLAQUENIL) 300 mg, Oral, Daily, After food       Social History     Socioeconomic History    Marital status: Single   Tobacco Use    Smoking status: Every Day     Current packs/day: 1.00     Types: Cigarettes    Smokeless tobacco: Never   Substance and Sexual Activity    Alcohol use: Not Currently    Drug use: Never    Sexual activity: Yes     Social Determinants of Health     Financial Resource Strain: Low Risk  (2023)    Overall Financial Resource Strain (CARDIA)     Difficulty of Paying Living Expenses: Not very hard   Food Insecurity: No Food Insecurity (2023)    Hunger Vital Sign     Worried About Running Out of Food in the Last Year: Never true     Ran Out of Food in the Last Year: Never true   Transportation Needs: Unmet Transportation Needs (2023)    PRAPARE - Transportation     Lack of Transportation (Medical): Yes     Lack of Transportation (Non-Medical): No   Physical Activity: Inactive (2023)    Exercise Vital Sign     Days of Exercise per Week: 0 days     Minutes of Exercise per Session: 0 min   Stress: Stress Concern Present (2023)    [employer] - Occupational Stress Questionnaire     Feeling of Stress : Rather much   Social Connections: Moderately Integrated (2023)    Social Connection and Isolation Panel [NHANES]     Frequency of Communication with Friends and Family: Twice a week     Frequency of Social Gatherings with Friends  and Family: Twice a week     Attends Restorationism Services: 1 to 4 times per year     Active Member of Clubs or Organizations: No     Attends Club or Organization Meetings: 1 to 4 times per year     Marital Status: Never    Housing Stability: Low Risk  (6/8/2023)    Housing Stability Vital Sign     Unable to Pay for Housing in the Last Year: No     Number of Places Lived in the Last Year: 1     Unstable Housing in the Last Year: No        Family History   Problem Relation Age of Onset    Hypertension Mother     Diabetes Mellitus Mother     Cancer Mother     Cancer Father     Rheum arthritis Maternal Aunt     Rheum arthritis Maternal Grandmother         Immunization History   Administered Date(s) Administered    Influenza - Trivalent - PF (ADULT) 11/07/2019, 10/29/2021       Patient Care Team:  Kira Griffith FNP as PCP - General (Family Medicine)     Subjective:     ROS    Constitutional:  Denies chills. Denies fever. Denies night sweats. Denies weight loss.   Ophthalmology: Denies blurred vision. Denies dry eyes. Denies eye pain. Denies Itching and redness.   ENT: Denies oral ulcers. Denies epistaxis. Denies dry mouth. Denies swollen glands.   Endocrine: Denies diabetes. Denies thyroid Problems.   Respiratory: Denies cough. Denies shortness of breath. Denies shortness of breath with exertion. Denies hemoptysis.   Cardiovascular: Denies chest pain at rest. Denies chest pain with exertion. Denies palpitations.    Gastrointestinal: Denies abdominal pain. Denies diarrhea. Denies nausea. Denies vomiting. Denies hematemesis or hematochezia. Denies heartburn.  Genitourinary: Denies blood in urine.  Musculoskeletal: See HPI for details  Integumentary: Denies rash. Denies photosensitivity.   Peripheral Vascular: Denies Ulcers of hands and/or feet. Denies Cold extremities.   Neurologic: Denies dizziness. Denies headache.  Denies loss of strength. Admits numbness or tingling.   Psychiatric: Denies depression. Denies  "anxiety. Denies suicidal/homicidal ideations.      Objective:     Visit Vitals  /88 (BP Location: Left arm, Patient Position: Sitting, BP Method: Large (Automatic))   Pulse 94   Temp 97.9 °F (36.6 °C) (Oral)   Resp 18   Ht 5' 2" (1.575 m)   Wt 64 kg (141 lb 3.2 oz)   LMP  (LMP Unknown)   SpO2 98%   BMI 25.83 kg/m²       Physical Exam    General Appearance: alert, pleasant, in no acute distress.  Skin: Skin color, texture, turgor normal. No rashes or lesions.  Eyes:  extraocular movement intact (EOMI), pupils equal, round, reactive to light and accommodation, conjunctiva clear.  ENT: No oral or nasal ulcers.  Neck:  Neck supple. No adenopathy.   Lungs: CTA bilaterally without crackles, rhonchi, or wheezes.   Heart: RRR w/o murmurs.  No edema. 2+ DP/TP pulse.  Abdomen: Soft, non-tender, no masses, rebound or guarding.  Neuro: Alert, oriented, CN II-XII GI, sensory and motor innervation intact.  Musculoskeletal: DJD changes in bilateral hands. Tenderness with ROM of right hip improved. Surgical scar healed on right knee and right shin. No swelling in right knee. Bilateral hallux valgus. No synovitis on exam today, no red, warm and swollen joints.   Psych: Alert, oriented, normal eye contact.      Labs Reviewed:   12/15/2017: . CCP IgG LC >250. WAQAS negative.   11/7/2019: WAQAS negative.  RNA Poly III negative. Sed Rate 10 (<20). Fibrilliarin, PM/Scl 100, Centromere, RNP, Scler all negative.     Imaging:   10/12/2021   XR Foot Left Minimum 3 Views: Degenerative changes. Hallux valgus deformity.  Remote trauma to the fifth metatarsal   10/12/2021  XR Foot Right Minimum 3 Views: Degenerative changes with hallux valgus deformity. Questionable small erosion on the medial aspect by the head of the first metatarsal.   10/12/2021 10:11 CDT XR Hand Right Minimum 3 Views: Degenerative changes.   10/12/2021  XR Hand Left Minimum 3 Views: Minimal degenerative changes.   10/12/2021 XR Chest 2 View: No acute cardiopulmonary " process identified.    2/20/23:  X-ray of thoracic spine showed mild DJD changes, multilevel small marginal osteophytes. Hallux valgus bilaterally, old 5th meratarsal fracture on left. DJD changes in bilateral hands and feet stable compared to 2021.  The pelvic view shows joint space narrowing sclerosis and mild hypertrophic bone formation in the right hip joint.  There is some subchondral cysts seen in the femoral head on the right side as well.  Findings are significantly worse when compared to the prior examination.  The left hip appears grossly unremarkable with no evidence of significant degenerative changes or of acute fracture or dislocation is seen.Multi level DJD of lumbar spine.       Assessment:       ICD-10-CM ICD-9-CM   1. Rheumatoid arthritis involving multiple sites with positive rheumatoid factor  M05.79 714.0   2. Avascular necrosis  M87.00 733.40   3. Primary osteoarthritis involving multiple joints  M15.9 715.98   4. Latent tuberculosis  Z22.7 795.51   5. Sciatica of right side  M54.31 724.3   6. Raynaud's phenomenon without gangrene  I73.00 443.0   7. Primary hypertension  I10 401.9   8. Nicotine dependence, cigarettes, uncomplicated  F17.210 305.1   9. Mixed anxiety depressive disorder  F41.8 300.4            Plan:     1. Rheumatoid arthritis involving multiple sites with positive rheumatoid factor  dx 2017. Labs in 2017 show  and CCP >250. Reports having a migratory pattern of joint pain about 17 years ago following her last pregnancy and progressively worsening over the years. She was treated with methotrexate, did not tolerate it because of nausea.  Did not tolerate leflunomide, caused blisters on her leg.  She was treated with Humira in the past, she was not sure if it had helped with joint pain or not, took it for 4-5 months, stopped in summer 2022.  She had seen Dr. Munoz in the past.  - no synovitis on exam today.  C/o stiffness and pain in hands.   - Start Plaquenil 300 mg daily.  Discussed risks and benefits of medication with patient. Sent referral to optho.   - hallux valgus of bilateral great toes, no tenderness with MTPs.  - advised to cut down on use of BC powder, advised to take Tylenol as needed for joint pain.  - X-rays showed generalized DJD.  - Will get X-rays of neck and shoulders.    2. Latent tuberculosis  Completed 6 months of treatment from 2/21-8/21.    3. Raynaud's phenomenon without gangrene  WAQAS, Scleroderma panel, and centromere ab all negative    4. Primary hypertension  Not taking any medications, blood pressure elevated.  Sent referral to family medicine clinic, follow up with Mr Kira DIAZ.     5. Tobacco user  Discussed smoking cessation, interested in stopping smoking. Provided information for smoking cessation. Spent additional 5 minutes discussing about smoking cessation.      6. Osteonectosis of right hip  Doing well post op, not using crutches any more.     Plaquenil(hydroxychloroquine) treatment was discussed with the patient.    Risks and benefits of this medication was explained to the patient.  Discussed side effects including retinal deposits and retinopathy related to plaquenil.  Discussed risk of myopathy, neuropathy and nausea as well.  Instructed patient it is recommended to see an eye doctor for a baseline exam and yearly after that, although eye complications are rare and occur after many years(and are dose related).     Recommend starting Plaquenil at 300 mg daily.     Follow up in about 6 months (around 8/26/2024) for with NP. In addition to their scheduled follow up, the patient has also been instructed to follow up on as needed basis.        Total time spent with patient and documentation is 30 minutes. All questions were answered to patient's satisfaction and patient verbalized understanding.

## 2024-02-26 NOTE — TELEPHONE ENCOUNTER
X-ray of cervical spine showed hiatus with mild instability, please send referral to neurosurgery.

## 2024-02-27 ENCOUNTER — TELEPHONE (OUTPATIENT)
Dept: RHEUMATOLOGY | Facility: CLINIC | Age: 49
End: 2024-02-27
Payer: MEDICAID

## 2024-02-27 DIAGNOSIS — M53.2X2 SPINAL INSTABILITY OF CERVICAL REGION: ICD-10-CM

## 2024-02-27 DIAGNOSIS — M47.812 CERVICAL SPINE ARTHRITIS: Primary | ICD-10-CM

## 2024-02-28 DIAGNOSIS — R74.8 ELEVATED CPK: Primary | ICD-10-CM

## 2024-03-14 ENCOUNTER — HOSPITAL ENCOUNTER (OUTPATIENT)
Dept: RADIOLOGY | Facility: HOSPITAL | Age: 49
Discharge: HOME | End: 2024-03-14
Attending: INTERNAL MEDICINE
Payer: MEDICAID

## 2024-03-14 DIAGNOSIS — M47.812 CERVICAL SPINE ARTHRITIS: ICD-10-CM

## 2024-03-14 DIAGNOSIS — M53.2X2 SPINAL INSTABILITY OF CERVICAL REGION: ICD-10-CM

## 2024-03-14 PROCEDURE — 72141 MRI NECK SPINE W/O DYE: CPT | Mod: TC

## 2024-03-14 NOTE — PROGRESS NOTES
Received results of MRI of cervical spine, please send results to neurosurgery  As requested.  Please make sure she gets a follow-up with them.

## 2024-04-09 ENCOUNTER — OFFICE VISIT (OUTPATIENT)
Dept: FAMILY MEDICINE | Facility: CLINIC | Age: 49
End: 2024-04-09
Payer: MEDICAID

## 2024-04-09 VITALS
WEIGHT: 145 LBS | DIASTOLIC BLOOD PRESSURE: 106 MMHG | BODY MASS INDEX: 26.68 KG/M2 | HEIGHT: 62 IN | SYSTOLIC BLOOD PRESSURE: 152 MMHG | HEART RATE: 85 BPM | RESPIRATION RATE: 18 BRPM | OXYGEN SATURATION: 100 % | TEMPERATURE: 98 F

## 2024-04-09 DIAGNOSIS — Z12.4 ENCOUNTER FOR PAPANICOLAOU SMEAR OF CERVIX: ICD-10-CM

## 2024-04-09 DIAGNOSIS — Z12.12 ENCOUNTER FOR COLORECTAL CANCER SCREENING: ICD-10-CM

## 2024-04-09 DIAGNOSIS — F41.8 MIXED ANXIETY DEPRESSIVE DISORDER: ICD-10-CM

## 2024-04-09 DIAGNOSIS — Z12.31 ENCOUNTER FOR SCREENING MAMMOGRAM FOR BREAST CANCER: ICD-10-CM

## 2024-04-09 DIAGNOSIS — M54.31 BACK PAIN WITH RIGHT-SIDED SCIATICA: ICD-10-CM

## 2024-04-09 DIAGNOSIS — I10 PRIMARY HYPERTENSION: Primary | ICD-10-CM

## 2024-04-09 DIAGNOSIS — Z12.11 ENCOUNTER FOR COLORECTAL CANCER SCREENING: ICD-10-CM

## 2024-04-09 DIAGNOSIS — H91.93 DECREASED HEARING OF BOTH EARS: ICD-10-CM

## 2024-04-09 PROCEDURE — 3008F BODY MASS INDEX DOCD: CPT | Mod: CPTII,,, | Performed by: NURSE PRACTITIONER

## 2024-04-09 PROCEDURE — 4010F ACE/ARB THERAPY RXD/TAKEN: CPT | Mod: CPTII,,, | Performed by: NURSE PRACTITIONER

## 2024-04-09 PROCEDURE — 1159F MED LIST DOCD IN RCRD: CPT | Mod: CPTII,,, | Performed by: NURSE PRACTITIONER

## 2024-04-09 PROCEDURE — 3077F SYST BP >= 140 MM HG: CPT | Mod: CPTII,,, | Performed by: NURSE PRACTITIONER

## 2024-04-09 PROCEDURE — 3080F DIAST BP >= 90 MM HG: CPT | Mod: CPTII,,, | Performed by: NURSE PRACTITIONER

## 2024-04-09 PROCEDURE — 99214 OFFICE O/P EST MOD 30 MIN: CPT | Mod: S$PBB,,, | Performed by: NURSE PRACTITIONER

## 2024-04-09 PROCEDURE — 1160F RVW MEDS BY RX/DR IN RCRD: CPT | Mod: CPTII,,, | Performed by: NURSE PRACTITIONER

## 2024-04-09 PROCEDURE — 99215 OFFICE O/P EST HI 40 MIN: CPT | Mod: PBBFAC | Performed by: NURSE PRACTITIONER

## 2024-04-09 RX ORDER — VALSARTAN 80 MG/1
80 TABLET ORAL NIGHTLY
Qty: 30 TABLET | Refills: 11 | Status: SHIPPED | OUTPATIENT
Start: 2024-04-09 | End: 2025-04-09

## 2024-04-09 NOTE — PATIENT INSTRUCTIONS
Donnie Celis,     If you are due for any health screening(s) below please notify me so we can arrange them to be ordered and scheduled. Most healthy patients at your age complete them, but you are free to accept or refuse.     If you can't do it, I'll definitely understand. If you can, I'd certainly appreciate it!    Tests to Keep You Healthy    Mammogram: ORDERED BUT NOT SCHEDULED  Colon Cancer Screening: ORDERED  Cervical Cancer Screening: DUE  Last Blood Pressure <= 139/89 (4/9/2024): Yes      Schedule your breast cancer screening today     Breast cancer is the second most common cancer in women,  and the second leading cause of death from cancer. Mammograms can detect breast cancer early, which significantly increases the chances of curing the cancer.       Our records indicate that you may be overdue for breast cancer screening. Cancer screenings save lives, so schedule yours today to stay healthy.     If you recently had a mammogram performed outside of [facility], please let your Health care team know so that they can update your health record.        Its time for your colon cancer screening     Colorectal cancer is one of the leading causes of cancer death for men and women but it doesnt have to be. Screenings can prevent colorectal cancer or find it early enough to treat and cure the disease.     Our records indicate that you may be overdue for colon cancer screening. A colonoscopy or stool screening test can help identify patients at risk for developing colon cancer. Cancer screenings save lives, so schedule yours today to stay healthy.     A colonoscopy is the preferred test for detecting colon cancer. It is needed only once every 10 years if results are negative. While you are sedated, a flexible, lighted tube with a tiny camera is inserted into the rectum and advanced through the colon to look for cancers.     An alternative screening test that is used at home and returned to the lab may also  be used. It detects hidden blood in bowel movements which could indicate cancer in the colon. If results are positive, you will need a colonoscopy to determine if the blood is a sign of cancer. This type of follow up (diagnostic) colonoscopy usually requires additional copays as required by your insurance provider.     If you recently had your colon cancer screening performed outside of [facility], please let your Health care team know so that they can update your health record. Please contact your PCP if you have any questions.    Your cervical cancer screening is due     Our records indicate that you may be overdue for your screening Pap smear. A Pap smear is an important health screening that can detect abnormal cells that can become cervical cancer. Cervical cancer screenings allow for early diagnosis and increase the likelihood of successful treatment.     The current recommendation for Pap smear screening is every 3-5 years for women at average risk. We encourage you to schedule your appointment with your [facility] health provider. Many women see a gynecologist for this screening, but some primary care providers also provide Pap screening.     If you recently had your Pap smear screening performed outside of [facility], please let your health care team know so that they can update your health record.      Lets manage your high blood pressure     Your blood pressure was above 140/90 today during your visit. We recommend that you schedule a nurse visit in two weeks to check your blood pressure and discuss ways to support your health goals.     You can also manage your health and record your blood pressure from the comfort of home by keeping a daily blood pressure log. These results are shared with and reviewed by your provider. Please print this form (Daily Blood Pressure Log) to assist you in keeping track of your blood pressure at home.     Schedule your nurse visit in two weeks to learn  more about how to track and manage high blood pressure.    Daily Blood Pressure Log    Name:__________________________________                  Date of Birth:_________    Average Blood Pressure:  __________      Date: Time  (a.m.) Blood  Pressure: Pulse  Rate: Time  (p.m.) Blood  Pressure : Pulse  Rate:   Sample 8:37 127/83 84                                                                                                                                                                                   Were here to help you quit smoking     Our records indicated that you are still smoking. One of the best things you can do for your health is to stop smoking and we are here to help.     Talk with your provider about our Smoking Cessation Program and how we can support you on your journey.

## 2024-04-09 NOTE — PROGRESS NOTES
Patient Name: Vimal Beasley   : 1975  MRN: [id number]     SUBJECTIVE DATA:    CHIEF COMPLAINT:   Vimal Beasley is a 49 y.o. female who presents to clinic today with Anxiety and RESTLESS LEG SYNDROME        HPI: 49-year-old female presents to the clinic to with her daughter, to follow-up on anxiety and restless leg syndrome.  Previous PCP was Dr. Jaleel Mclean.  Past medical history rheumatoid arthritis, right hip pain, bilateral feet pain anxiety and depression.  Compliant with office visits follow-up.      2023:     Rheumatoid arthritis:  Patient state she has history of rheumatoid arthritis. she used to be be managed here at [facility] as well by her PCP Dr.Yamen Mclean.  Has not had a follow-up for quite some time.  Patient currently not taking any medication for her rheumatoid arthritis.  Patient state she just got established with Rheumatology on 2023. Patient state she has some blood work to be done prior to her next visit with Rheumatology Dr.Supraja Patton.  Advised patient to complete blood work and keep her follow-up appointment on  at 11:00 a.m. with Rheumatology.  Patient verbalized.     Rt hip pain:  Chronic. Patient stated the issue has been going on for quite some time.  Patient state pain started after a fall caused her to have a fracture to her right tibial /fibula with external fixation on 2020.  Also complains of chronic right knee pain specially distal femur due to her fall.  State since then she is been having issue with ambulation and right lower back pain with sciatica.  Patient used to be followed with orthopedic clinic here at [facility] in the past and she was not compliant with her appointments.  Discussed with patient will request medical records from Dr.Yamen Mclean.  Discussed with patient will initiate gabapentin 300 mg p.o. at bedtime, precaution discussed.  Instructed patient to avoid high dose aspirin containing products to prevent any gastrointestinal  "ulcers and bleed.  Suggested Rx Tylenol Arthritis 650 mg 2 tabs p.o. every 8 hours as needed for pain.  Also discussed referral to orthopedic clinic for right hip pain and questionable avascular necrosis of right hip.  Questions solicited and answered patient verbalized and agreed to plan of care.  X-rays reviewed at bedside with patient.    02/20/2023 Rt hip:There is joint space narrowing sclerosis and mild hypertrophic bone formation in the right hip.  There is some subchondral cyst formation seen in the femoral head on the right.  Some sclerosis is seen in the femoral head as well.  Findings are overall significantly worse when compared the prior examination.  Avascular necrosis is suspected.  No fracture is seen.  No dislocation is seen.   02/20/2023 Left hip: No abnormality seen in the left hip.     Bilateral feet pain: chronic.  Discussed with patient referral to podiatrist Dr. Desir in [place] to follow-up on her chronic feet pain.  Patient agreed referral initiated.  Discussed recent x-rays of feet at bedside with patient.    02/20/2023 Left foot: Stable bunion at the distal 1st metatarsal and hallux valgus deformity.  Stable areas of subchondral cyst of the distal 1st metatarsal    02/20/2023 Right foot: Hallux valgus deformity seen with a small subchondral cyst and mild erosion in the distal 1st metatarsal overall unchanged since prior examination.     Anxiety and depression:  PHQ-9 score is 13, carlos-7 score is 12.  Patient states she has history of anxiety and not much of a depression. "Just anxious and worried all the time".  State her depression is due what happened to her since the fall.  Patient state he used to be on multiple medications for her depression anxiety but can not remember the names.  Patient state Dr.Yamen Mclean to manage her anxiety and depression.  Medical records requested.  Discussed with patient Rx Celexa 10 mg p.o. once daily and to return in 4-6 weeks for follow-up or sooner if " needed.  Discussed medication side effects.  Questions solicited and answered.  Realized and agreed to plan of care.      04/09/2024:     Hypertension:  Blood pressure 152/106.  Repeated prior to discharge 152/106.   Discussed with patient will initiate valsartan 80 mg p.o. once daily.  Patient state her daughter is a nurse and she can check her blood pressure manually at home.  Patient agreed to follow-up in 2 weeks virtual visit.  Patient to document blood pressure and discuss during visit.  -2 g a day dietary sodium restriction  -control high blood pressure (goal blood pressure is less than 130/80), patient was advised to check blood pressure once or twice a week and bring blood pressure logs to next office visit)  -exercise at least 30 minutes a day, 5 days a week  -maintain healthy weight  -decrease or stop alcohol use  -do not smoke  -stay well hydrated (drink water only, avoid juices, sweet tea, and sodas)  -ask about staying up-to-date on vaccinations (flu vaccine, pneumonia vaccine, hepatitis B vaccine)  -avoid excessive use of NSAIDs (ibuprofen, naproxen, Aleve, Advil, Toradol, Mobic), take Tylenol as needed for headache or mild pain  -take cholesterol lowering medications if prescribed (LDL goal less than 100).  Questions solicited and answered, patient verbalized and agreed to plan.      Chronic right hip pain:  Patient state she had right hip surgery at [facility] on November 9, 2023.  Patient is sitting comfortably on the chair denies any discomfort or issues to right hip    Bilateral hearing loss:  Patient state she has noticed hearing decreased bilateral ear.  Patient thinking she might have wax buildup.  On exam no wax buildup.  Patient denies exposure to high noise.  Patient denies working at a job that has a lot of noise.  Denies any head trauma or recent fall.  Denies any dizziness or blurry vision.    Discussed referral read hearing test and then a referral to ENT for  "follow-up.  Questions solicited and answered, patient verbalized and       Mixed anxiety depressive disorder:  PHQ -2 score is 2, carlos -7 score is 15.     - Patient was seen initially on June 8, 2023  "PHQ-9 score is 13, carlos-7 score is 12.  Patient states she has history of anxiety and not much of a depression. "Just anxious and worried all the time".  State her depression is due what happened to her since the fall.  Patient state he used to be on multiple medications for her depression anxiety but can not remember the names.  Patient state Dr.Yamen Mclean to manage her anxiety and depression.  Medical records requested.  Discussed with patient Rx Celexa 10 mg p.o. once daily and to return in 4-6 weeks for follow-up or sooner if needed.  Discussed medication side effects.  Questions solicited and answered.  Realized and agreed to plan of care. "    - Patient did not follow-up.  Discussed buspirone, discussed re-initiation Celexa at a higher dose.  Patient state" I tried a lot of medications and not of work".  Recommended referral to behavioral health for evaluation and management nurse practitioner Nicole Paige.  Questions solicited and answered, patient verbalized and agreed to plan.      Neurosurgery appointment:  Patient state she has an appointment on April 25, 2024 in [place] to discuss neck issues.  Continue appointment with Rheumatology as directed.  Patient verbalized.  Patient requesting medication refill of gabapentin.  Patient continued to have right-sided lower back pain with sciatica.  Patient state she is benefitting from medication without side effects.  Precaution discussed.  Rx gabapentin 300 mg p.o. at bedtime has been sent to preferred pharmacy.        ALLERGIES: Review of patient's allergies indicates:  No Known Allergies      ROS:  Review of Systems   HENT:  Positive for hearing loss.    Musculoskeletal:  Positive for back pain (Chronic right-sided lower back pain with sciatica). " "  Psychiatric/Behavioral:  The patient is nervous/anxious.    All other systems reviewed and are negative.        OBJECTIVE DATA:  Vital signs  Vitals:    04/09/24 1205 04/09/24 1235   BP: (!) 148/92 (!) 152/106   Pulse: 85    Resp: 18    Temp: 98.1 °F (36.7 °C)    TempSrc: Oral    SpO2: 100%    Weight: 65.8 kg (145 lb)    Height: 5' 2" (1.575 m)       Body mass index is 26.52 kg/m².    PHYSICAL EXAM:   Physical Exam  Vitals and nursing note reviewed.   Constitutional:       General: She is awake. She is not in acute distress.     Appearance: Normal appearance. She is well-developed, well-groomed and overweight. She is not ill-appearing, toxic-appearing or diaphoretic.   HENT:      Head: Normocephalic and atraumatic.      Right Ear: Tympanic membrane, ear canal and external ear normal.      Left Ear: Tympanic membrane, ear canal and external ear normal.      Nose: Nose normal. No congestion or rhinorrhea.      Mouth/Throat:      Mouth: Mucous membranes are moist.      Dentition: Abnormal dentition.      Tongue: No lesions. Tongue does not deviate from midline.      Palate: No mass and lesions.      Pharynx: Oropharynx is clear. Uvula midline.   Eyes:      General: Lids are normal. No scleral icterus.     Extraocular Movements: Extraocular movements intact.      Conjunctiva/sclera: Conjunctivae normal.      Pupils: Pupils are equal, round, and reactive to light.   Neck:      Trachea: Trachea and phonation normal.   Cardiovascular:      Rate and Rhythm: Normal rate and regular rhythm.      Pulses: Normal pulses.           Radial pulses are 2+ on the right side and 2+ on the left side.      Heart sounds: Normal heart sounds. No murmur heard.  Pulmonary:      Effort: Pulmonary effort is normal.      Breath sounds: Normal breath sounds and air entry. No wheezing or rhonchi.   Abdominal:      Palpations: Abdomen is soft.      Tenderness: There is no abdominal tenderness. There is no right CVA tenderness or left CVA " tenderness.   Genitourinary:     Comments: Denies any urinary symptoms or bowel habit changes.  Musculoskeletal:         General: Normal range of motion.      Cervical back: Normal range of motion and neck supple. No rigidity or tenderness.      Right lower leg: No edema.      Left lower leg: No edema.   Lymphadenopathy:      Cervical: No cervical adenopathy.   Skin:     General: Skin is warm and dry.      Capillary Refill: Capillary refill takes less than 2 seconds.      Findings: No rash.   Neurological:      General: No focal deficit present.      Mental Status: She is alert and oriented to person, place, and time. Mental status is at baseline.      GCS: GCS eye subscore is 4. GCS verbal subscore is 5. GCS motor subscore is 6.      Cranial Nerves: Cranial nerves 2-12 are intact. No cranial nerve deficit.      Sensory: Sensation is intact. No sensory deficit.      Motor: Motor function is intact. No weakness.      Coordination: Coordination is intact. Coordination normal.      Gait: Gait is intact. Gait normal.   Psychiatric:         Attention and Perception: Attention and perception normal.         Mood and Affect: Mood normal.         Speech: Speech normal.         Behavior: Behavior normal. Behavior is cooperative.         Thought Content: Thought content normal. Thought content does not include homicidal or suicidal ideation.         Cognition and Memory: Cognition and memory normal.         Judgment: Judgment normal.          ASSESSMENT/PLAN:  1. Primary hypertension  Assessment & Plan:   Blood pressure 152/106.  Discussed with patient will initiate valsartan 80 mg p.o. once daily.  Patient state her daughter is a nurse and she can check her blood pressure manually at home.  Patient agreed to follow-up in 2 weeks virtual visit.  Patient to document blood pressure and discuss during visit.  -2 g a day dietary sodium restriction  -control high blood pressure (goal blood pressure is less than 130/80), patient  "was advised to check blood pressure once or twice a week and bring blood pressure logs to next office visit)  -exercise at least 30 minutes a day, 5 days a week  -maintain healthy weight  -decrease or stop alcohol use  -do not smoke  -stay well hydrated (drink water only, avoid juices, sweet tea, and sodas)  -ask about staying up-to-date on vaccinations (flu vaccine, pneumonia vaccine, hepatitis B vaccine)  -avoid excessive use of NSAIDs (ibuprofen, naproxen, Aleve, Advil, Toradol, Mobic), take Tylenol as needed for headache or mild pain  -take cholesterol lowering medications if prescribed (LDL goal less than 100).  Questions solicited and answered, patient verbalized and agreed to plan.    Orders:  -     valsartan (DIOVAN) 80 MG tablet; Take 1 tablet (80 mg total) by mouth every evening.  Dispense: 30 tablet; Refill: 11    2. Mixed anxiety depressive disorder  Assessment & Plan:  PHQ -2 score is 2, carlos -7 score is 15.     - Patient was seen initially on June 8, 2023  "PHQ-9 score is 13, carlos-7 score is 12.  Patient states she has history of anxiety and not much of a depression. "Just anxious and worried all the time".  State her depression is due what happened to her since the fall.  Patient state he used to be on multiple medications for her depression anxiety but can not remember the names.  Patient state Dr.Yamen Mclean to manage her anxiety and depression.  Medical records requested.  Discussed with patient Rx Celexa 10 mg p.o. once daily and to return in 4-6 weeks for follow-up or sooner if needed.  Discussed medication side effects.  Questions solicited and answered.  Realized and agreed to plan of care. "    - Patient did not follow-up.  Discussed buspirone, discussed re-initiation Celexa at a higher dose.  Patient state" I tried a lot of medications and not of work".  Recommended referral to behavioral health for evaluation and management nurse practitioner Nicole Paige.  Questions solicited and " answered, patient verbalized and agreed to plan.    Orders:  -     Ambulatory referral/consult to Psychiatry; Future; Expected date: 04/16/2024    3. Decreased hearing of both ears  Assessment & Plan:  Patient state she has noticed hearing decreased bilateral ear.  Patient thinking she might have wax buildup.  On exam no wax buildup.  Patient denies exposure to high noise.  Patient denies working at a job that has a lot of noise.  Denies any head trauma or recent fall.  Denies any dizziness or blurry vision.    Discussed referral read hearing test and then a referral to ENT for follow-up.  Questions solicited and answered, patient verbalized and     Orders:  -     Ambulatory referral/consult to Audiology; Future; Expected date: 04/16/2024  -     Ambulatory referral/consult to ENT; Future; Expected date: 04/16/2024    4. Back pain with right-sided sciatica  Assessment & Plan:  Patient state she has an appointment on April 25, 2024 in [place] to discuss neck issues.  Continue appointment with Rheumatology as directed.  Patient verbalized.  Patient requesting medication refill of gabapentin.  Patient continued to have right-sided lower back pain with sciatica.  Patient state she is benefitting from medication without side effects.  Precaution discussed.  Rx gabapentin 300 mg p.o. at bedtime has been sent to preferred pharmacy.      Orders:  -     gabapentin (NEURONTIN) 300 MG capsule; Take 1 capsule (300 mg total) by mouth every evening.  Dispense: 30 capsule; Refill: 3    5. Encounter for screening mammogram for breast cancer  -     Mammo Digital Screening Bilat w/ Pranav; Future; Expected date: 04/09/2024    6. Encounter for colorectal cancer screening  -     Cologuard Screening (Multitarget Stool DNA); Future; Expected date: 04/09/2024    7. Encounter for Papanicolaou smear of cervix  -     Ambulatory referral/consult to Gynecology; Future; Expected date: 04/16/2024           RESULTS:  No results found for this or  any previous visit (from the past 1008 hour(s)).      Follow Up:  Follow up in about 6 months (around 10/9/2024).      Previous medical history/lab work/radiology reviewed and considered during medical management decisions.   Medication list reviewed and medication reconciliation performed.  Patient was provided  and care about his/her current diagnosis (es) and medications including risk/benefit and side effects/adverse events, over the counter medication uses/doses, home self-care and contact precautions,  and red flags and indications for when to seek immediate medical attention.   Patient was advised to continue compliance with current medication list and medical recommendations.  Patient dvised continued compliance with recommended eating habits/ diets for medical conditions and exercise 150 minutes/ week (if possible) for medical condition (s).  Educational handouts and instructions on selected disease management in AVS (After Visit Summary).    All of the patient's questions were answered to patient's satisfaction.   The patient was receptive, expressed verbal understanding and agreement the above plan.      This note was created with the assistance of a voice recognition software or phone dictation. There may be transcription errors as a result of using this technology however minimal. Effort has been made to assure accuracy of transcription but any obvious errors or omissions should be clarified with the author of the document

## 2024-04-10 PROBLEM — H91.93 DECREASED HEARING OF BOTH EARS: Status: ACTIVE | Noted: 2024-04-10

## 2024-04-10 PROBLEM — Z12.4 ENCOUNTER FOR PAPANICOLAOU SMEAR OF CERVIX: Status: ACTIVE | Noted: 2024-04-10

## 2024-04-10 RX ORDER — GABAPENTIN 300 MG/1
300 CAPSULE ORAL NIGHTLY
Qty: 30 CAPSULE | Refills: 3 | Status: SHIPPED | OUTPATIENT
Start: 2024-04-10

## 2024-04-10 NOTE — ASSESSMENT & PLAN NOTE
Patient state she has an appointment on April 25, 2024 in [place] to discuss neck issues.  Continue appointment with Rheumatology as directed.  Patient verbalized.  Patient requesting medication refill of gabapentin.  Patient continued to have right-sided lower back pain with sciatica.  Patient state she is benefitting from medication without side effects.  Precaution discussed.  Rx gabapentin 300 mg p.o. at bedtime has been sent to preferred pharmacy.

## 2024-04-10 NOTE — ASSESSMENT & PLAN NOTE
"PHQ -2 score is 2, carlos -7 score is 15.     - Patient was seen initially on June 8, 2023  "PHQ-9 score is 13, carlos-7 score is 12.  Patient states she has history of anxiety and not much of a depression. "Just anxious and worried all the time".  State her depression is due what happened to her since the fall.  Patient state he used to be on multiple medications for her depression anxiety but can not remember the names.  Patient state Dr.Yamen Mclean to manage her anxiety and depression.  Medical records requested.  Discussed with patient Rx Celexa 10 mg p.o. once daily and to return in 4-6 weeks for follow-up or sooner if needed.  Discussed medication side effects.  Questions solicited and answered.  Realized and agreed to plan of care. "    - Patient did not follow-up.  Discussed buspirone, discussed re-initiation Celexa at a higher dose.  Patient state" I tried a lot of medications and not of work".  Recommended referral to behavioral health for evaluation and management nurse practitioner Nicole Paige.  Questions solicited and answered, patient verbalized and agreed to plan.  "

## 2024-04-10 NOTE — ASSESSMENT & PLAN NOTE
Patient state she has noticed hearing decreased bilateral ear.  Patient thinking she might have wax buildup.  On exam no wax buildup.  Patient denies exposure to high noise.  Patient denies working at a job that has a lot of noise.  Denies any head trauma or recent fall.  Denies any dizziness or blurry vision.    Discussed referral read hearing test and then a referral to ENT for follow-up.  Questions solicited and answered, patient verbalized and

## 2024-04-10 NOTE — ASSESSMENT & PLAN NOTE
Blood pressure 152/106.  Discussed with patient will initiate valsartan 80 mg p.o. once daily.  Patient state her daughter is a nurse and she can check her blood pressure manually at home.  Patient agreed to follow-up in 2 weeks virtual visit.  Patient to document blood pressure and discuss during visit.  -2 g a day dietary sodium restriction  -control high blood pressure (goal blood pressure is less than 130/80), patient was advised to check blood pressure once or twice a week and bring blood pressure logs to next office visit)  -exercise at least 30 minutes a day, 5 days a week  -maintain healthy weight  -decrease or stop alcohol use  -do not smoke  -stay well hydrated (drink water only, avoid juices, sweet tea, and sodas)  -ask about staying up-to-date on vaccinations (flu vaccine, pneumonia vaccine, hepatitis B vaccine)  -avoid excessive use of NSAIDs (ibuprofen, naproxen, Aleve, Advil, Toradol, Mobic), take Tylenol as needed for headache or mild pain  -take cholesterol lowering medications if prescribed (LDL goal less than 100).  Questions solicited and answered, patient verbalized and agreed to plan.

## 2024-04-10 NOTE — ASSESSMENT & PLAN NOTE
Blood pressure 152/106.  Discussed with patient will initiate valsartan 80 mg p.o. once daily.  Patient state her daughter is a nurse and she can check her blood pressure manually at home.  Patient agreed to follow-up in 2 weeks virtual visit.  Patient to document blood pressure and discuss during visit.  Follow low-salt diet.  Stay physically active by exercising at least 30 minutes a day up to 5 days a week as simple as brisk walking.  Fiber intake between 25-30 g per day if possible.  Questions solicited and answered, patient verbalized and agreed to plan.

## 2024-05-06 ENCOUNTER — PATIENT MESSAGE (OUTPATIENT)
Dept: FAMILY MEDICINE | Facility: CLINIC | Age: 49
End: 2024-05-06
Payer: MEDICAID

## 2024-05-06 DIAGNOSIS — M54.31 BACK PAIN WITH RIGHT-SIDED SCIATICA: ICD-10-CM

## 2024-05-06 RX ORDER — GABAPENTIN 300 MG/1
300 CAPSULE ORAL NIGHTLY
Qty: 30 CAPSULE | Refills: 3 | OUTPATIENT
Start: 2024-05-06

## 2024-07-21 DIAGNOSIS — M54.31 BACK PAIN WITH RIGHT-SIDED SCIATICA: ICD-10-CM

## 2024-07-22 RX ORDER — GABAPENTIN 300 MG/1
300 CAPSULE ORAL NIGHTLY
Qty: 30 CAPSULE | Refills: 3 | OUTPATIENT
Start: 2024-07-22

## 2024-09-20 DIAGNOSIS — M54.31 BACK PAIN WITH RIGHT-SIDED SCIATICA: ICD-10-CM

## 2024-09-23 ENCOUNTER — TELEPHONE (OUTPATIENT)
Dept: FAMILY MEDICINE | Facility: CLINIC | Age: 49
End: 2024-09-23
Payer: MEDICAID

## 2024-09-23 DIAGNOSIS — Z12.31 ENCOUNTER FOR SCREENING MAMMOGRAM FOR BREAST CANCER: Primary | ICD-10-CM

## 2024-09-23 DIAGNOSIS — M54.31 BACK PAIN WITH RIGHT-SIDED SCIATICA: ICD-10-CM

## 2024-09-23 RX ORDER — GABAPENTIN 300 MG/1
300 CAPSULE ORAL NIGHTLY
Qty: 30 CAPSULE | Refills: 3 | OUTPATIENT
Start: 2024-09-23

## 2024-09-23 RX ORDER — GABAPENTIN 300 MG/1
300 CAPSULE ORAL NIGHTLY
Qty: 30 CAPSULE | Refills: 3 | Status: SHIPPED | OUTPATIENT
Start: 2024-09-23

## 2024-09-23 NOTE — TELEPHONE ENCOUNTER
----- Message from JOE Potter sent at 9/23/2024  8:15 AM CDT -----  Regarding: Mammogram  Please remind patient she needs to complete mammogram testing.  Thank you

## 2024-09-24 ENCOUNTER — TELEPHONE (OUTPATIENT)
Dept: FAMILY MEDICINE | Facility: CLINIC | Age: 49
End: 2024-09-24
Payer: MEDICAID

## 2024-09-30 ENCOUNTER — TELEPHONE (OUTPATIENT)
Dept: FAMILY MEDICINE | Facility: CLINIC | Age: 49
End: 2024-09-30
Payer: MEDICAID

## 2024-09-30 NOTE — TELEPHONE ENCOUNTER
Patient states that she has the kit but has not completed it yet. Patient states she will try to complete soon.    ----- Message from JOE Sagastume sent at 9/24/2024  1:40 PM CDT -----  Regarding: Colorectal cancer screening  Please advise patient it is time to complete colorectal cancer screening.  Please advise patient to collect sample and to bring it to clinic to process.  Thank you

## 2024-10-16 ENCOUNTER — PATIENT MESSAGE (OUTPATIENT)
Dept: RHEUMATOLOGY | Facility: CLINIC | Age: 49
End: 2024-10-16
Payer: MEDICAID

## 2024-10-16 RX ORDER — HYDROXYCHLOROQUINE SULFATE 200 MG/1
300 TABLET, FILM COATED ORAL DAILY
Qty: 60 TABLET | Refills: 5 | Status: SHIPPED | OUTPATIENT
Start: 2024-10-16

## 2024-10-16 NOTE — TELEPHONE ENCOUNTER
Placed a tc to patient mobile phone to notify that request for refill on Plaquenil was sent to pharmacy. Patient did not answer phone message was left

## 2024-12-22 DIAGNOSIS — M54.31 BACK PAIN WITH RIGHT-SIDED SCIATICA: ICD-10-CM

## 2024-12-23 RX ORDER — GABAPENTIN 300 MG/1
300 CAPSULE ORAL NIGHTLY
Qty: 30 CAPSULE | Refills: 3 | OUTPATIENT
Start: 2024-12-23

## 2025-06-26 ENCOUNTER — HOSPITAL ENCOUNTER (EMERGENCY)
Facility: HOSPITAL | Age: 50
Discharge: ANOTHER HOSPITAL | End: 2025-06-27
Attending: INTERNAL MEDICINE
Payer: MEDICAID
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
## 2025-06-26 DIAGNOSIS — S42.491A OTHER CLOSED DISPLACED FRACTURE OF DISTAL END OF RIGHT HUMERUS, INITIAL ENCOUNTER: Primary | ICD-10-CM

## 2025-06-26 DIAGNOSIS — S42.431A DISPLACED FRACTURE (AVULSION) OF LATERAL EPICONDYLE OF RIGHT HUMERUS, INITIAL ENCOUNTER FOR CLOSED FRACTURE: ICD-10-CM
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
## 2025-06-26 PROCEDURE — 63600175 PHARM REV CODE 636 W HCPCS: Mod: JZ,TB | Performed by: INTERNAL MEDICINE

## 2025-06-26 PROCEDURE — 96372 THER/PROPH/DIAG INJ SC/IM: CPT | Performed by: INTERNAL MEDICINE

## 2025-06-26 PROCEDURE — 99284 EMERGENCY DEPT VISIT MOD MDM: CPT | Mod: 25

## 2025-06-26 PROCEDURE — 99285 EMERGENCY DEPT VISIT HI MDM: CPT | Mod: 25

## 2025-06-26 RX ORDER — HYDROMORPHONE HYDROCHLORIDE 2 MG/ML
1 INJECTION, SOLUTION INTRAMUSCULAR; INTRAVENOUS; SUBCUTANEOUS
Status: COMPLETED | OUTPATIENT
Start: 2025-06-26 | End: 2025-06-26

## 2025-06-26 RX ORDER — KETOROLAC TROMETHAMINE 30 MG/ML
30 INJECTION, SOLUTION INTRAMUSCULAR; INTRAVENOUS
Status: COMPLETED | OUTPATIENT
Start: 2025-06-26 | End: 2025-06-26

## 2025-06-26 RX ADMIN — KETOROLAC TROMETHAMINE 30 MG: 30 INJECTION, SOLUTION INTRAMUSCULAR; INTRAVENOUS at 09:06

## 2025-06-26 RX ADMIN — HYDROMORPHONE HYDROCHLORIDE 1 MG: 2 INJECTION INTRAMUSCULAR; INTRAVENOUS; SUBCUTANEOUS at 11:06

## 2025-06-27 ENCOUNTER — ANESTHESIA EVENT (OUTPATIENT)
Dept: SURGERY | Facility: HOSPITAL | Age: 50
End: 2025-06-27
Payer: MEDICAID

## 2025-06-27 ENCOUNTER — HOSPITAL ENCOUNTER (OUTPATIENT)
Facility: HOSPITAL | Age: 50
Discharge: HOME | End: 2025-06-28
Attending: INTERNAL MEDICINE | Admitting: STUDENT IN AN ORGANIZED HEALTH CARE EDUCATION/TRAINING PROGRAM
Payer: MEDICAID

## 2025-06-27 ENCOUNTER — ANESTHESIA (OUTPATIENT)
Dept: SURGERY | Facility: HOSPITAL | Age: 50
End: 2025-06-27
Payer: MEDICAID

## 2025-06-27 VITALS
HEIGHT: 62 IN | DIASTOLIC BLOOD PRESSURE: 109 MMHG | WEIGHT: 161 LBS | SYSTOLIC BLOOD PRESSURE: 163 MMHG | RESPIRATION RATE: 18 BRPM | HEART RATE: 81 BPM | TEMPERATURE: 98 F | BODY MASS INDEX: 29.63 KG/M2 | OXYGEN SATURATION: 95 %
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
## 2025-06-27 DIAGNOSIS — S42.451A CLOSED FRACTURE OF CAPITULUM OF RIGHT HUMERUS, INITIAL ENCOUNTER: ICD-10-CM

## 2025-06-27 DIAGNOSIS — S42.431A CLOSED DISPLACED FRACTURE OF LATERAL EPICONDYLE OF RIGHT HUMERUS, UNSPECIFIED FRACTURE MORPHOLOGY, INITIAL ENCOUNTER: Primary | ICD-10-CM

## 2025-06-27 DIAGNOSIS — S42.431A DISPLACED FRACTURE (AVULSION) OF LATERAL EPICONDYLE OF RIGHT HUMERUS, INITIAL ENCOUNTER FOR CLOSED FRACTURE: ICD-10-CM

## 2025-06-27 LAB
ABORH RETYPE: NORMAL
ALBUMIN SERPL-MCNC: 3.1 G/DL (ref 3.5–5)
ALBUMIN/GLOB SERPL: 0.9 RATIO (ref 1.1–2)
ALP SERPL-CCNC: 136 UNIT/L (ref 40–150)
ALT SERPL-CCNC: 18 UNIT/L (ref 0–55)
ANION GAP SERPL CALC-SCNC: 8 MEQ/L
AST SERPL-CCNC: 21 UNIT/L (ref 11–45)
BASOPHILS # BLD AUTO: 0.04 X10(3)/MCL
BASOPHILS NFR BLD AUTO: 0.5 %
BILIRUB SERPL-MCNC: 0.2 MG/DL
BUN SERPL-MCNC: 15.6 MG/DL (ref 9.8–20.1)
CALCIUM SERPL-MCNC: 8.6 MG/DL (ref 8.4–10.2)
CHLORIDE SERPL-SCNC: 108 MMOL/L (ref 98–107)
CO2 SERPL-SCNC: 22 MMOL/L (ref 22–29)
CREAT SERPL-MCNC: 0.82 MG/DL (ref 0.55–1.02)
CREAT/UREA NIT SERPL: 19
EOSINOPHIL # BLD AUTO: 0.24 X10(3)/MCL (ref 0–0.9)
EOSINOPHIL NFR BLD AUTO: 2.8 %
ERYTHROCYTE [DISTWIDTH] IN BLOOD BY AUTOMATED COUNT: 19.3 % (ref 11.5–17)
GFR SERPLBLD CREATININE-BSD FMLA CKD-EPI: >60 ML/MIN/1.73/M2
GLOBULIN SER-MCNC: 3.4 GM/DL (ref 2.4–3.5)
GLUCOSE SERPL-MCNC: 80 MG/DL (ref 74–100)
GROUP & RH: NORMAL
HCT VFR BLD AUTO: 41.8 % (ref 37–47)
HGB BLD-MCNC: 12.4 G/DL (ref 12–16)
IMM GRANULOCYTES # BLD AUTO: 0.02 X10(3)/MCL (ref 0–0.04)
IMM GRANULOCYTES NFR BLD AUTO: 0.2 %
INDIRECT COOMBS: NORMAL
LYMPHOCYTES # BLD AUTO: 2.22 X10(3)/MCL (ref 0.6–4.6)
LYMPHOCYTES NFR BLD AUTO: 25.7 %
MCH RBC QN AUTO: 24.1 PG (ref 27–31)
MCHC RBC AUTO-ENTMCNC: 29.7 G/DL (ref 33–36)
MCV RBC AUTO: 81.2 FL (ref 80–94)
MONOCYTES # BLD AUTO: 0.67 X10(3)/MCL (ref 0.1–1.3)
MONOCYTES NFR BLD AUTO: 7.7 %
NEUTROPHILS # BLD AUTO: 5.46 X10(3)/MCL (ref 2.1–9.2)
NEUTROPHILS NFR BLD AUTO: 63.1 %
NRBC BLD AUTO-RTO: 0 %
OHS QRS DURATION: 70 MS
OHS QTC CALCULATION: 455 MS
PLATELET # BLD AUTO: 323 X10(3)/MCL (ref 130–400)
PMV BLD AUTO: 9.5 FL (ref 7.4–10.4)
POTASSIUM SERPL-SCNC: 3.8 MMOL/L (ref 3.5–5.1)
PROT SERPL-MCNC: 6.5 GM/DL (ref 6.4–8.3)
RBC # BLD AUTO: 5.15 X10(6)/MCL (ref 4.2–5.4)
SODIUM SERPL-SCNC: 138 MMOL/L (ref 136–145)
SPECIMEN OUTDATE: NORMAL
WBC # BLD AUTO: 8.65 X10(3)/MCL (ref 4.5–11.5)

## 2025-06-27 PROCEDURE — 25000003 PHARM REV CODE 250: Performed by: NURSE ANESTHETIST, CERTIFIED REGISTERED

## 2025-06-27 PROCEDURE — 24579 OPTX HUMRL CNDYLR FRACTURE: CPT | Mod: RT,,, | Performed by: ORTHOPAEDIC SURGERY

## 2025-06-27 PROCEDURE — G0378 HOSPITAL OBSERVATION PER HR: HCPCS

## 2025-06-27 PROCEDURE — 36000711: Performed by: ORTHOPAEDIC SURGERY

## 2025-06-27 PROCEDURE — 63600175 PHARM REV CODE 636 W HCPCS: Performed by: ANESTHESIOLOGY

## 2025-06-27 PROCEDURE — 99285 EMERGENCY DEPT VISIT HI MDM: CPT | Mod: 25

## 2025-06-27 PROCEDURE — C1713 ANCHOR/SCREW BN/BN,TIS/BN: HCPCS | Performed by: ORTHOPAEDIC SURGERY

## 2025-06-27 PROCEDURE — 36000710: Performed by: ORTHOPAEDIC SURGERY

## 2025-06-27 PROCEDURE — 71000033 HC RECOVERY, INTIAL HOUR: Performed by: ORTHOPAEDIC SURGERY

## 2025-06-27 PROCEDURE — 63600175 PHARM REV CODE 636 W HCPCS: Performed by: INTERNAL MEDICINE

## 2025-06-27 PROCEDURE — 27201423 OPTIME MED/SURG SUP & DEVICES STERILE SUPPLY: Performed by: ORTHOPAEDIC SURGERY

## 2025-06-27 PROCEDURE — 63600175 PHARM REV CODE 636 W HCPCS: Performed by: ORTHOPAEDIC SURGERY

## 2025-06-27 PROCEDURE — 63600175 PHARM REV CODE 636 W HCPCS

## 2025-06-27 PROCEDURE — 80053 COMPREHEN METABOLIC PANEL: CPT

## 2025-06-27 PROCEDURE — 86901 BLOOD TYPING SEROLOGIC RH(D): CPT

## 2025-06-27 PROCEDURE — 97161 PT EVAL LOW COMPLEX 20 MIN: CPT

## 2025-06-27 PROCEDURE — 63600175 PHARM REV CODE 636 W HCPCS: Performed by: NURSE ANESTHETIST, CERTIFIED REGISTERED

## 2025-06-27 PROCEDURE — 24575 OPTX HUMERAL EPCNDYLR FX: CPT | Mod: 51,RT,, | Performed by: ORTHOPAEDIC SURGERY

## 2025-06-27 PROCEDURE — 25000003 PHARM REV CODE 250: Performed by: INTERNAL MEDICINE

## 2025-06-27 PROCEDURE — 29125 APPL SHORT ARM SPLINT STATIC: CPT | Mod: RT

## 2025-06-27 PROCEDURE — 25000003 PHARM REV CODE 250

## 2025-06-27 PROCEDURE — 24575 OPTX HUMERAL EPCNDYLR FX: CPT | Mod: AS,51,RT, | Performed by: PHYSICIAN ASSISTANT

## 2025-06-27 PROCEDURE — 85025 COMPLETE CBC W/AUTO DIFF WBC: CPT

## 2025-06-27 PROCEDURE — 63600175 PHARM REV CODE 636 W HCPCS: Performed by: PHYSICIAN ASSISTANT

## 2025-06-27 PROCEDURE — 96361 HYDRATE IV INFUSION ADD-ON: CPT

## 2025-06-27 PROCEDURE — 93010 ELECTROCARDIOGRAM REPORT: CPT | Mod: ,,, | Performed by: INTERNAL MEDICINE

## 2025-06-27 PROCEDURE — 93005 ELECTROCARDIOGRAM TRACING: CPT

## 2025-06-27 PROCEDURE — 99204 OFFICE O/P NEW MOD 45 MIN: CPT | Mod: 57,,, | Performed by: ORTHOPAEDIC SURGERY

## 2025-06-27 PROCEDURE — 96372 THER/PROPH/DIAG INJ SC/IM: CPT

## 2025-06-27 PROCEDURE — 96374 THER/PROPH/DIAG INJ IV PUSH: CPT

## 2025-06-27 PROCEDURE — 37000008 HC ANESTHESIA 1ST 15 MINUTES: Performed by: ORTHOPAEDIC SURGERY

## 2025-06-27 PROCEDURE — 24579 OPTX HUMRL CNDYLR FRACTURE: CPT | Mod: AS,RT,, | Performed by: PHYSICIAN ASSISTANT

## 2025-06-27 PROCEDURE — P9047 ALBUMIN (HUMAN), 25%, 50ML: HCPCS | Performed by: ANESTHESIOLOGY

## 2025-06-27 PROCEDURE — 96375 TX/PRO/DX INJ NEW DRUG ADDON: CPT

## 2025-06-27 PROCEDURE — 37000009 HC ANESTHESIA EA ADD 15 MINS: Performed by: ORTHOPAEDIC SURGERY

## 2025-06-27 PROCEDURE — 64415 NJX AA&/STRD BRCH PLXS IMG: CPT | Performed by: ANESTHESIOLOGY

## 2025-06-27 DEVICE — IMPLANTABLE DEVICE: Type: IMPLANTABLE DEVICE | Site: ARM | Status: FUNCTIONAL

## 2025-06-27 RX ORDER — GLUCAGON 1 MG
1 KIT INJECTION
Status: DISCONTINUED | OUTPATIENT
Start: 2025-06-27 | End: 2025-06-27 | Stop reason: HOSPADM

## 2025-06-27 RX ORDER — ROCURONIUM BROMIDE 10 MG/ML
INJECTION, SOLUTION INTRAVENOUS
Status: DISCONTINUED | OUTPATIENT
Start: 2025-06-27 | End: 2025-06-27

## 2025-06-27 RX ORDER — PHENYLEPHRINE HCL IN 0.9% NACL 1 MG/10 ML
SYRINGE (ML) INTRAVENOUS
Status: DISCONTINUED | OUTPATIENT
Start: 2025-06-27 | End: 2025-06-27

## 2025-06-27 RX ORDER — SODIUM CHLORIDE, SODIUM GLUCONATE, SODIUM ACETATE, POTASSIUM CHLORIDE AND MAGNESIUM CHLORIDE 30; 37; 368; 526; 502 MG/100ML; MG/100ML; MG/100ML; MG/100ML; MG/100ML
INJECTION, SOLUTION INTRAVENOUS CONTINUOUS
OUTPATIENT
Start: 2025-06-27 | End: 2025-07-27

## 2025-06-27 RX ORDER — MIDAZOLAM HYDROCHLORIDE 2 MG/2ML
2 INJECTION, SOLUTION INTRAMUSCULAR; INTRAVENOUS ONCE AS NEEDED
Status: COMPLETED | OUTPATIENT
Start: 2025-06-27 | End: 2025-06-27

## 2025-06-27 RX ORDER — LIDOCAINE HYDROCHLORIDE 10 MG/ML
1 INJECTION, SOLUTION EPIDURAL; INFILTRATION; INTRACAUDAL; PERINEURAL ONCE
OUTPATIENT
Start: 2025-06-27 | End: 2025-06-27

## 2025-06-27 RX ORDER — SODIUM CHLORIDE, SODIUM GLUCONATE, SODIUM ACETATE, POTASSIUM CHLORIDE AND MAGNESIUM CHLORIDE 30; 37; 368; 526; 502 MG/100ML; MG/100ML; MG/100ML; MG/100ML; MG/100ML
INJECTION, SOLUTION INTRAVENOUS CONTINUOUS
Status: DISCONTINUED | OUTPATIENT
Start: 2025-06-27 | End: 2025-06-28 | Stop reason: HOSPADM

## 2025-06-27 RX ORDER — CEFAZOLIN SODIUM 1 G/3ML
2 INJECTION, POWDER, FOR SOLUTION INTRAMUSCULAR; INTRAVENOUS
Status: DISCONTINUED | OUTPATIENT
Start: 2025-06-27 | End: 2025-06-27 | Stop reason: HOSPADM

## 2025-06-27 RX ORDER — POLYETHYLENE GLYCOL 3350 17 G/17G
17 POWDER, FOR SOLUTION ORAL 2 TIMES DAILY
Status: DISCONTINUED | OUTPATIENT
Start: 2025-06-27 | End: 2025-06-28 | Stop reason: HOSPADM

## 2025-06-27 RX ORDER — ALBUMIN HUMAN 250 G/1000ML
12.5 SOLUTION INTRAVENOUS ONCE
Status: COMPLETED | OUTPATIENT
Start: 2025-06-27 | End: 2025-06-27

## 2025-06-27 RX ORDER — SODIUM CHLORIDE, SODIUM LACTATE, POTASSIUM CHLORIDE, CALCIUM CHLORIDE 600; 310; 30; 20 MG/100ML; MG/100ML; MG/100ML; MG/100ML
INJECTION, SOLUTION INTRAVENOUS CONTINUOUS
OUTPATIENT
Start: 2025-06-27

## 2025-06-27 RX ORDER — METHOCARBAMOL 100 MG/ML
1000 INJECTION, SOLUTION INTRAMUSCULAR; INTRAVENOUS ONCE
Status: COMPLETED | OUTPATIENT
Start: 2025-06-27 | End: 2025-06-27

## 2025-06-27 RX ORDER — OXYCODONE HYDROCHLORIDE 5 MG/1
5 TABLET ORAL EVERY 4 HOURS PRN
Refills: 0 | Status: DISCONTINUED | OUTPATIENT
Start: 2025-06-27 | End: 2025-06-28 | Stop reason: HOSPADM

## 2025-06-27 RX ORDER — HYDROCODONE BITARTRATE AND ACETAMINOPHEN 5; 325 MG/1; MG/1
1 TABLET ORAL EVERY 4 HOURS PRN
Refills: 0 | OUTPATIENT
Start: 2025-06-27

## 2025-06-27 RX ORDER — GABAPENTIN 300 MG/1
300 CAPSULE ORAL 3 TIMES DAILY
Status: DISCONTINUED | OUTPATIENT
Start: 2025-06-27 | End: 2025-06-28 | Stop reason: HOSPADM

## 2025-06-27 RX ORDER — HYDROMORPHONE HYDROCHLORIDE 2 MG/ML
0.4 INJECTION, SOLUTION INTRAMUSCULAR; INTRAVENOUS; SUBCUTANEOUS EVERY 5 MIN PRN
Status: DISCONTINUED | OUTPATIENT
Start: 2025-06-27 | End: 2025-06-27 | Stop reason: HOSPADM

## 2025-06-27 RX ORDER — GABAPENTIN 300 MG/1
300 CAPSULE ORAL
Status: COMPLETED | OUTPATIENT
Start: 2025-06-27 | End: 2025-06-27

## 2025-06-27 RX ORDER — ACETAMINOPHEN 325 MG/1
650 TABLET ORAL EVERY 4 HOURS PRN
OUTPATIENT
Start: 2025-06-27

## 2025-06-27 RX ORDER — DEXAMETHASONE SODIUM PHOSPHATE 4 MG/ML
INJECTION, SOLUTION INTRA-ARTICULAR; INTRALESIONAL; INTRAMUSCULAR; INTRAVENOUS; SOFT TISSUE
Status: DISCONTINUED | OUTPATIENT
Start: 2025-06-27 | End: 2025-06-27

## 2025-06-27 RX ORDER — CEFAZOLIN SODIUM 1 G/3ML
INJECTION, POWDER, FOR SOLUTION INTRAMUSCULAR; INTRAVENOUS
Status: DISCONTINUED | OUTPATIENT
Start: 2025-06-27 | End: 2025-06-27

## 2025-06-27 RX ORDER — CEFAZOLIN SODIUM 2 G/50ML
2 SOLUTION INTRAVENOUS
Status: COMPLETED | OUTPATIENT
Start: 2025-06-27 | End: 2025-06-28

## 2025-06-27 RX ORDER — ONDANSETRON 4 MG/1
4 TABLET, ORALLY DISINTEGRATING ORAL ONCE
OUTPATIENT
Start: 2025-06-27 | End: 2025-06-27

## 2025-06-27 RX ORDER — ACETAMINOPHEN 325 MG/1
650 TABLET ORAL EVERY 6 HOURS
Status: DISCONTINUED | OUTPATIENT
Start: 2025-06-27 | End: 2025-06-28 | Stop reason: HOSPADM

## 2025-06-27 RX ORDER — LIDOCAINE HYDROCHLORIDE 20 MG/ML
INJECTION, SOLUTION EPIDURAL; INFILTRATION; INTRACAUDAL; PERINEURAL
Status: DISCONTINUED | OUTPATIENT
Start: 2025-06-27 | End: 2025-06-27

## 2025-06-27 RX ORDER — ONDANSETRON HYDROCHLORIDE 2 MG/ML
INJECTION, SOLUTION INTRAMUSCULAR; INTRAVENOUS
Status: DISCONTINUED | OUTPATIENT
Start: 2025-06-27 | End: 2025-06-27

## 2025-06-27 RX ORDER — ADHESIVE BANDAGE
30 BANDAGE TOPICAL DAILY PRN
Status: DISCONTINUED | OUTPATIENT
Start: 2025-06-27 | End: 2025-06-28 | Stop reason: HOSPADM

## 2025-06-27 RX ORDER — ONDANSETRON HYDROCHLORIDE 2 MG/ML
4 INJECTION, SOLUTION INTRAVENOUS DAILY PRN
Status: DISCONTINUED | OUTPATIENT
Start: 2025-06-27 | End: 2025-06-27 | Stop reason: HOSPADM

## 2025-06-27 RX ORDER — MEPERIDINE HYDROCHLORIDE 25 MG/ML
12.5 INJECTION INTRAMUSCULAR; INTRAVENOUS; SUBCUTANEOUS ONCE
Status: DISCONTINUED | OUTPATIENT
Start: 2025-06-27 | End: 2025-06-27 | Stop reason: HOSPADM

## 2025-06-27 RX ORDER — ENOXAPARIN SODIUM 100 MG/ML
40 INJECTION SUBCUTANEOUS EVERY 12 HOURS
Status: DISCONTINUED | OUTPATIENT
Start: 2025-06-27 | End: 2025-06-28 | Stop reason: HOSPADM

## 2025-06-27 RX ORDER — ROPIVACAINE HYDROCHLORIDE 5 MG/ML
INJECTION, SOLUTION EPIDURAL; INFILTRATION; PERINEURAL
Status: COMPLETED | OUTPATIENT
Start: 2025-06-27 | End: 2025-06-27

## 2025-06-27 RX ORDER — TALC
6 POWDER (GRAM) TOPICAL NIGHTLY PRN
Status: DISCONTINUED | OUTPATIENT
Start: 2025-06-27 | End: 2025-06-28 | Stop reason: HOSPADM

## 2025-06-27 RX ORDER — DOCUSATE SODIUM 100 MG/1
100 CAPSULE, LIQUID FILLED ORAL 2 TIMES DAILY
Status: DISCONTINUED | OUTPATIENT
Start: 2025-06-27 | End: 2025-06-28 | Stop reason: HOSPADM

## 2025-06-27 RX ORDER — OXYCODONE HYDROCHLORIDE 10 MG/1
10 TABLET ORAL EVERY 4 HOURS PRN
Refills: 0 | Status: DISCONTINUED | OUTPATIENT
Start: 2025-06-27 | End: 2025-06-28 | Stop reason: HOSPADM

## 2025-06-27 RX ORDER — METHOCARBAMOL 500 MG/1
500 TABLET, FILM COATED ORAL EVERY 8 HOURS
Status: DISCONTINUED | OUTPATIENT
Start: 2025-06-27 | End: 2025-06-28 | Stop reason: HOSPADM

## 2025-06-27 RX ORDER — DIPHENHYDRAMINE HYDROCHLORIDE 50 MG/ML
25 INJECTION, SOLUTION INTRAMUSCULAR; INTRAVENOUS ONCE
Status: DISCONTINUED | OUTPATIENT
Start: 2025-06-27 | End: 2025-06-27 | Stop reason: HOSPADM

## 2025-06-27 RX ORDER — PROPOFOL 10 MG/ML
VIAL (ML) INTRAVENOUS
Status: DISCONTINUED | OUTPATIENT
Start: 2025-06-27 | End: 2025-06-27

## 2025-06-27 RX ORDER — ROPIVACAINE HYDROCHLORIDE 5 MG/ML
30 INJECTION, SOLUTION EPIDURAL; INFILTRATION; PERINEURAL ONCE
Status: COMPLETED | OUTPATIENT
Start: 2025-06-27 | End: 2025-06-27

## 2025-06-27 RX ORDER — HYDROMORPHONE HYDROCHLORIDE 2 MG/ML
0.5 INJECTION, SOLUTION INTRAMUSCULAR; INTRAVENOUS; SUBCUTANEOUS
Status: COMPLETED | OUTPATIENT
Start: 2025-06-27 | End: 2025-06-27

## 2025-06-27 RX ORDER — FENTANYL CITRATE 50 UG/ML
INJECTION, SOLUTION INTRAMUSCULAR; INTRAVENOUS
Status: DISCONTINUED | OUTPATIENT
Start: 2025-06-27 | End: 2025-06-27

## 2025-06-27 RX ORDER — MORPHINE SULFATE 4 MG/ML
4 INJECTION, SOLUTION INTRAMUSCULAR; INTRAVENOUS EVERY 6 HOURS PRN
Refills: 0 | Status: DISCONTINUED | OUTPATIENT
Start: 2025-06-27 | End: 2025-06-28 | Stop reason: HOSPADM

## 2025-06-27 RX ORDER — VANCOMYCIN HYDROCHLORIDE 1 G/20ML
INJECTION, POWDER, LYOPHILIZED, FOR SOLUTION INTRAVENOUS
Status: DISCONTINUED | OUTPATIENT
Start: 2025-06-27 | End: 2025-06-27 | Stop reason: HOSPADM

## 2025-06-27 RX ORDER — SODIUM CHLORIDE, SODIUM LACTATE, POTASSIUM CHLORIDE, CALCIUM CHLORIDE 600; 310; 30; 20 MG/100ML; MG/100ML; MG/100ML; MG/100ML
INJECTION, SOLUTION INTRAVENOUS CONTINUOUS
Status: DISCONTINUED | OUTPATIENT
Start: 2025-06-27 | End: 2025-06-28 | Stop reason: HOSPADM

## 2025-06-27 RX ADMIN — HYDROMORPHONE HYDROCHLORIDE 0.5 MG: 2 INJECTION INTRAMUSCULAR; INTRAVENOUS; SUBCUTANEOUS at 04:06

## 2025-06-27 RX ADMIN — DEXAMETHASONE SODIUM PHOSPHATE 4 MG: 4 INJECTION, SOLUTION INTRA-ARTICULAR; INTRALESIONAL; INTRAMUSCULAR; INTRAVENOUS; SOFT TISSUE at 10:06

## 2025-06-27 RX ADMIN — POLYETHYLENE GLYCOL 3350 17 G: 17 POWDER, FOR SOLUTION ORAL at 09:06

## 2025-06-27 RX ADMIN — MIDAZOLAM HYDROCHLORIDE 1 MG: 1 INJECTION, SOLUTION INTRAMUSCULAR; INTRAVENOUS at 08:06

## 2025-06-27 RX ADMIN — GABAPENTIN 300 MG: 300 CAPSULE ORAL at 09:06

## 2025-06-27 RX ADMIN — ACETAMINOPHEN 650 MG: 325 TABLET ORAL at 05:06

## 2025-06-27 RX ADMIN — METHOCARBAMOL 1000 MG: 100 INJECTION INTRAMUSCULAR; INTRAVENOUS at 04:06

## 2025-06-27 RX ADMIN — Medication 200 MCG: at 10:06

## 2025-06-27 RX ADMIN — FENTANYL CITRATE 100 MCG: 50 INJECTION, SOLUTION INTRAMUSCULAR; INTRAVENOUS at 09:06

## 2025-06-27 RX ADMIN — SODIUM CHLORIDE, POTASSIUM CHLORIDE, SODIUM LACTATE AND CALCIUM CHLORIDE: 600; 310; 30; 20 INJECTION, SOLUTION INTRAVENOUS at 09:06

## 2025-06-27 RX ADMIN — Medication 100 MCG: at 10:06

## 2025-06-27 RX ADMIN — PROPOFOL 150 MG: 10 INJECTION, EMULSION INTRAVENOUS at 09:06

## 2025-06-27 RX ADMIN — METHOCARBAMOL 500 MG: 500 TABLET ORAL at 12:06

## 2025-06-27 RX ADMIN — ROPIVACAINE HYDROCHLORIDE 30 ML: 5 INJECTION, SOLUTION EPIDURAL; INFILTRATION; PERINEURAL at 08:06

## 2025-06-27 RX ADMIN — ENOXAPARIN SODIUM 40 MG: 40 INJECTION SUBCUTANEOUS at 12:06

## 2025-06-27 RX ADMIN — METHOCARBAMOL 500 MG: 500 TABLET ORAL at 09:06

## 2025-06-27 RX ADMIN — CEFAZOLIN 2 G: 330 INJECTION, POWDER, FOR SOLUTION INTRAMUSCULAR; INTRAVENOUS at 09:06

## 2025-06-27 RX ADMIN — POLYETHYLENE GLYCOL 3350 17 G: 17 POWDER, FOR SOLUTION ORAL at 12:06

## 2025-06-27 RX ADMIN — CEFAZOLIN SODIUM 2 G: 2 SOLUTION INTRAVENOUS at 01:06

## 2025-06-27 RX ADMIN — SUGAMMADEX 200 MG: 100 INJECTION, SOLUTION INTRAVENOUS at 10:06

## 2025-06-27 RX ADMIN — DOCUSATE SODIUM 100 MG: 100 CAPSULE, LIQUID FILLED ORAL at 09:06

## 2025-06-27 RX ADMIN — DOCUSATE SODIUM 100 MG: 100 CAPSULE, LIQUID FILLED ORAL at 12:06

## 2025-06-27 RX ADMIN — SODIUM CHLORIDE, SODIUM GLUCONATE, SODIUM ACETATE, POTASSIUM CHLORIDE AND MAGNESIUM CHLORIDE: 526; 502; 368; 37; 30 INJECTION, SOLUTION INTRAVENOUS at 09:06

## 2025-06-27 RX ADMIN — GABAPENTIN 300 MG: 300 CAPSULE ORAL at 04:06

## 2025-06-27 RX ADMIN — LIDOCAINE HYDROCHLORIDE 2 ML: 20 INJECTION, SOLUTION EPIDURAL; INFILTRATION; INTRACAUDAL; PERINEURAL at 09:06

## 2025-06-27 RX ADMIN — ROCURONIUM BROMIDE 40 MG: 10 SOLUTION INTRAVENOUS at 09:06

## 2025-06-27 RX ADMIN — OXYCODONE HYDROCHLORIDE 10 MG: 10 TABLET ORAL at 09:06

## 2025-06-27 RX ADMIN — ALBUMIN (HUMAN) 12.5 G: 12.5 SOLUTION INTRAVENOUS at 12:06

## 2025-06-27 RX ADMIN — ONDANSETRON 4 MG: 2 INJECTION INTRAMUSCULAR; INTRAVENOUS at 10:06

## 2025-06-27 RX ADMIN — ENOXAPARIN SODIUM 40 MG: 40 INJECTION SUBCUTANEOUS at 09:06

## 2025-06-27 RX ADMIN — OXYCODONE HYDROCHLORIDE 10 MG: 10 TABLET ORAL at 01:06

## 2025-06-27 RX ADMIN — CEFAZOLIN SODIUM 2 G: 2 SOLUTION INTRAVENOUS at 09:06

## 2025-06-27 RX ADMIN — GABAPENTIN 300 MG: 300 CAPSULE ORAL at 12:06

## 2025-06-27 RX ADMIN — SODIUM CHLORIDE, POTASSIUM CHLORIDE, SODIUM LACTATE AND CALCIUM CHLORIDE: 600; 310; 30; 20 INJECTION, SOLUTION INTRAVENOUS at 05:06

## 2025-06-27 NOTE — ANESTHESIA POSTPROCEDURE EVALUATION
Anesthesia Post Evaluation    Patient: Vimal Beasley    Procedure(s) Performed: Procedure(s) (LRB):  OPEN TREATMENT, FRACTURE, HUMERUS, EPICONDYLE (Right)  ORIF, FRACTURE, HUMERUS, CONDYLE (Right)    Final Anesthesia Type: general      Patient location during evaluation: PACU  Patient participation: Yes- Able to Participate  Level of consciousness: awake and alert and oriented  Post-procedure vital signs: reviewed and stable  Pain management: adequate  Airway patency: patent  WARREN mitigation strategies: Verification of full reversal of neuromuscular block  PONV status at discharge: No PONV  Anesthetic complications: no      Cardiovascular status: blood pressure returned to baseline and stable  Respiratory status: spontaneous ventilation and unassisted  Hydration status: euvolemic  Follow-up not needed.  Comments: [facility]              Vitals Value Taken Time   BP 98/74 06/27/25 12:10   Temp 36 °C (96.8 °F) 06/27/25 12:00   Pulse 63 06/27/25 13:02   Resp 18 06/27/25 13:09   SpO2 97 % 06/27/25 13:02         Event Time   Out of Recovery 12:10:00         Pain/Ami Score: Pain Rating Prior to Med Admin: 8 (6/27/2025  1:09 PM)  Ami Score: 9 (6/27/2025 12:10 PM)

## 2025-06-27 NOTE — ANESTHESIA PROCEDURE NOTES
Peripheral Block    Patient location during procedure: pre-op   Block not for primary anesthetic.  Reason for block: at surgeon's request and post-op pain management   Post-op Pain Location: rt arm   Start time: 6/27/2025 8:47 AM  Timeout: 6/27/2025 8:46 AM   End time: 6/27/2025 8:48 AM    Staffing  Authorizing Provider: Raghu Negrete MD  Performing Provider: Raghu Negrete MD    Staffing  Performed by: Raghu Negrete MD  Authorized by: Raghu Negrete MD    Preanesthetic Checklist  Completed: patient identified, IV checked, site marked, risks and benefits discussed, surgical consent, monitors and equipment checked, pre-op evaluation and timeout performed  Peripheral Block  Patient position: supine  Prep: ChloraPrep  Patient monitoring: heart rate, cardiac monitor, continuous pulse ox, continuous capnometry and frequent blood pressure checks  Block type: supraclavicular  Laterality: right  Injection technique: single shot  Needle  Needle type: Stimuplex   Needle gauge: 22 G  Needle length: 4 in  Needle localization: anatomical landmarks, ultrasound guidance, nerve stimulator and paresthesias   -ultrasound image captured on disc.  Assessment  Injection assessment: negative aspiration, negative parasthesia and local visualized surrounding nerve  Paresthesia pain: none  Heart rate change: no  Slow fractionated injection: yes  Pain Tolerance: comfortable throughout block and no complaints  Medications:    Medications: ropivacaine (NAROPIN) injection 0.5% - Perineural   30 mL - 6/27/2025 8:47:00 AM    Additional Notes  VSS.  [facility] RN monitoring vitals throughout procedure.  Patient tolerated procedure well.   Sedation titrated. See nurses flowsheet.

## 2025-06-27 NOTE — OP NOTE
OPERATIVE REPORT      Patient: Vimal Beasley   : 1975    MRN: [id number]  Date: 2025      Surgeon:Ishaan Hernandez DO  Assistant: López Pichardo was essential, part of the procedure including deep hardware placement and deep closure.  No senior assistant was availible  Preoperative Diagnosis:  Right distal humerus fracture  Postoperative Diagnosis: Same  Procedure:    Open reduction internal fixation right lateral epicondyle fracture-CPT 01770  Open reduction internal fixation right lateral condyle fracture capitellum-CPT 16370  Anesthesiologist: No responsible provider has been recorded for the case.  OR Staff: Circulator: Ivania Yoo, RN  Radiology Technologist: Alyssia Ugalde, RT; Newton Poon RT  Scrub Person: Keiry Galvan ST  Implants:   Implant Name Type Inv. Item Serial No.  Lot No. LRB No. Used Action   WIRE FX KRSH .9MM 152MM 1 TROC - [id number]  WIRE FX KRSH .9MM 152MM 1 TROC  [employer]  Right 1 Implanted and Explanted   headless compression screw 3.5mm x35mm      Right 1 Implanted   SCREW HEADLESS COMPR 3.5X35MM - [id number]  SCREW HEADLESS COMPR 3.5X35MM  [employer]  Right 2 Implanted   KWIRE REDUCT HCS [id number] - [id number]  KWIRE REDUCT HCS [id number]  [employer]  Right 3 Implanted and Explanted     EBL: 40cc  Complications: None  Disposition: To PACU, stable    Indications: Vimal Beasley is a 50 y.o. female presenting with the aforementioned injuries/findings. The procedure is indicated to provide stability to the elbow.  The patient is awake and alert. After thorough discussion of the risks, benefits, expected outcomes, and alternatives to surgical intervention, the patient agreed to proceed with surgical treatment.  Specific risks discussed included, but were not limited to: superficial or deep infection, wound healing complications, DVT/PE, significant bleeding requiring transfusion, damage to named anatomic structures  in the immediate area including named neurovascular structures, infection, nonunion, malunion and general risks of anesthesia.  Major risk of iatrogenic fracture and return of arthrofibrosis.  The patient voiced understanding and written as well as verbal consent was obtained by myself prior to the procedure.    Patient has a lateral epicondyle fracture on the right elbow and a lateral condyle fracture through the capitellum with a displaced articular segment.  Patient has a heavy smoker.  Concern for osteonecrosis following fixation.  Also very high risk of stiffness loss of supination pronation and neurovascular injury    Procedure Note:  The patient was brought back to the OR and placed supine on the OR table. After successful induction of anesthesia by anesthesia staff, the patient was positioned in the supine position and all bony prominences were padded appropriately.  The surgical field was then provisionally cleansed and then prepped and draped in the usual sterile fashion.    At this time a time-out was performed, with the correct patient, site, and procedure identified.  The universal time out as well as sign your site protocols were followed.  Preoperative antibiotics were verified as administered.     Attention is drawn to the lateral elbow marked out our standard incisions for standard lateral approach to the distal humerus bluntly dissected down to the fascial plane incised the fascia protect neurovascular structures and entered the joint patient has a large hematoma which was removed.  Patient has a comminuted lateral epicondyle fracture which allowed us to visualize the lateral condyle capitellar fracture proximally 3 cm x 1 cm articular segment was displaced superiorly we retrieved this and placed it back in its anatomic position after for free up the fracture bed and placed 2 headless compression screws across the fracture fragment under intraoperative fluoroscopy and direct visualization satisfied  with the reduction which we could feel and visualize anteriorly and superiorly we then proceeded to fix the comminuted lateral epicondyle fracture was completed with a reduction maneuver followed by 3.5 screw from [employer].    We then took final images with intraoperative fluoroscopy.  Satisfied length alignment rotation we then began to close    The incision(s) was/were then irrigated using copious sterile saline and then vancomyocin was added to the wound bed for prophylaxis. The surgical wound was closed in layered fashion.  The surgical site(s) was/were were sterilely cleansed and dressed.    The patient was then subsequently transferred to to PACU in a stable condition.    All sponge and needle counts were correct at the end of the case.  I was present and participated in all aspects of the procedure.    Prognosis:  The patient will be kept NWB/ROMAT 12 weeks on the ipsilateral extremity  .  Patient will receive DVT prophylaxis . Patient may need return to the OR for excisional debridement or washout if infection/skin necrosis is observed.      This note/OR report was created with the assistance of  voice recognition software or phone  dictation.  There may be transcription errors as a result of using this technology however minimal. Effort has been made to assure accuracy of transcription but any obvious errors or omissions should be clarified with the author of the document.       Ishaan Hernandez, DO  Orthopedic Trauma Surgery

## 2025-06-27 NOTE — H&P
Trauma Surgery   History and Physical     Patient Name: Vimal Beasley  YOB: 1975  Date: 2025 4:13 AM  Date of Admission: 2025  HD#0  POD#* No surgery found *    PRESENTING HISTORY   Chief Complaint/Reason for Admission: Displaced fracture (avulsion) of lateral epicondyle of right humerus, initial encounter for closed fracture    History of Present Illness:  Patient is a 50 year old female who presents to the ED following a fall while working in the yard yesterday. She reports she fell onto her elbow and had immediate pain. Imaging at OSH significant for intraarticular elbow fracture and she was transferred for Orthopedic evaluation.     Review of Systems:  12 point ROS negative except as stated in HPI    PAST HISTORY:   Past medical history:  Patient reports only history is RA  Past Medical History:   Diagnosis Date    Arthritis     Back pain with right-sided sciatica 2023    Decreased hearing of both ears 04/10/2024    Depression     Mixed anxiety depressive disorder 2023    Primary hypertension 2023       Past surgical history:  Past Surgical History:   Procedure Laterality Date     SECTION      hip replacment Right     Leg Fracture repair         Family history:  Family History   Problem Relation Name Age of Onset    Hypertension Mother      Diabetes Mellitus Mother      Cancer Mother      Cancer Father      Rheum arthritis Maternal Aunt      Rheum arthritis Maternal Grandmother         Social history:  Patient reports 1 ppd cigarette smoker. Denies drugs or alcohol use.   Social History     Socioeconomic History    Marital status: Single   Tobacco Use    Smoking status: Every Day     Current packs/day: 1.00     Average packs/day: 1 pack/day for 13.2 years (13.2 ttl pk-yrs)     Types: Cigarettes     Start date: 4/10/2012    Smokeless tobacco: Never   Substance and Sexual Activity    Alcohol use: Not Currently    Drug use: Never    Sexual activity: Yes      Partners: Male     Social Drivers of Health     Financial Resource Strain: Low Risk  (6/8/2023)    Overall Financial Resource Strain (CARDIA)     Difficulty of Paying Living Expenses: Not very hard   Food Insecurity: No Food Insecurity (6/8/2023)    Hunger Vital Sign     Worried About Running Out of Food in the Last Year: Never true     Ran Out of Food in the Last Year: Never true   Transportation Needs: Unmet Transportation Needs (6/8/2023)    PRAPARE - Transportation     Lack of Transportation (Medical): Yes     Lack of Transportation (Non-Medical): No   Physical Activity: Inactive (6/8/2023)    Exercise Vital Sign     Days of Exercise per Week: 0 days     Minutes of Exercise per Session: 0 min   Stress: Stress Concern Present (6/8/2023)    [employer] - Occupational Stress Questionnaire     Feeling of Stress : Rather much   Housing Stability: Low Risk  (6/8/2023)    Housing Stability Vital Sign     Unable to Pay for Housing in the Last Year: No     Number of Places Lived in the Last Year: 1     Unstable Housing in the Last Year: No     Tobacco Use History[1]   Social History     Substance and Sexual Activity   Alcohol Use Not Currently        MEDICATIONS & ALLERGIES:   Allergies: Review of patient's allergies indicates:  No Known Allergies  Home Meds:   Current Outpatient Medications   Medication Instructions    aspirin/salicylamide/caffeine (ARTHRITIS STRENGTH BC POWDER ORAL) Oral, 3 packs in the am and 3 packs in the afternoon    gabapentin (NEURONTIN) 300 mg, Oral, Nightly    hydroxychloroquine (PLAQUENIL) 300 mg, Oral, Daily, After food    valsartan (DIOVAN) 80 mg, Oral, Nightly    Medications Ordered Prior to Encounter[2]   Current Facility-Administered Medications on File Prior to Encounter   Medication    [COMPLETED] HYDROmorphone (PF) injection 1 mg    [COMPLETED] ketorolac injection 30 mg     Current Outpatient Medications on File Prior to Encounter   Medication Sig     "aspirin/salicylamide/caffeine (ARTHRITIS STRENGTH BC POWDER ORAL) Take by mouth. 3 packs in the am and 3 packs in the afternoon    gabapentin (NEURONTIN) 300 MG capsule Take 1 capsule (300 mg total) by mouth every evening.    hydroxychloroquine (PLAQUENIL) 200 mg tablet Take 1.5 tablets (300 mg total) by mouth once daily. After food    valsartan (DIOVAN) 80 MG tablet Take 1 tablet (80 mg total) by mouth every evening.     Scheduled Meds:   acetaminophen  650 mg Oral Q6H    docusate sodium  100 mg Oral BID    enoxparin  40 mg Subcutaneous Q12H (prophylaxis, 0900/2100)    gabapentin  300 mg Oral ED 1 Time    gabapentin  300 mg Oral TID    HYDROmorphone  0.5 mg Intravenous ED 1 Time    methocarbamol injection  1,000 mg Intravenous Once    methocarbamoL  500 mg Oral Q8H    polyethylene glycol  17 g Oral BID     Continuous Infusions:   lactated ringers   Intravenous Continuous         PRN Meds:  Current Facility-Administered Medications:     magnesium hydroxide 400 mg/5 ml, 30 mL, Oral, Daily PRN    melatonin, 6 mg, Oral, Nightly PRN    oxyCODONE, 5 mg, Oral, Q4H PRN    oxyCODONE, 10 mg, Oral, Q4H PRN    OBJECTIVE:   Vital Signs:  VITAL SIGNS: 24 HR MIN & MAX LAST   Temp  Min: 97.7 °F (36.5 °C)  Max: 97.9 °F (36.6 °C)  97.7 °F (36.5 °C)   BP  Min: 136/93  Max: 163/109  (!) 139/97    Pulse  Min: 81  Max: 99  89    Resp  Min: 16  Max: 18  16    SpO2  Min: 95 %  Max: 100 %  100 %      HT: 5' 2" (157.5 cm)  WT: 73 kg (161 lb)  BMI: 29.4   Intake/output: No intake/output data recorded.     Lines/drains/airway:  Peripheral IV Single Lumen 06/27/25 0406 20 G Anterior;Left;Proximal Forearm (Active)   Number of days: 0       Physical Exam:  General:  Well developed, well nourished, no acute distress  HEENT:  Normocephalic, atraumatic  CV:  RR  Resp/chest: NWOB  GI:  Abdomen soft, non-tender, non-distended  :  Deferred  MSK:  No muscle atrophy, cyanosis, peripheral edema, moving all extremities spontaneously, right upper " extremity in splint and sling with sensation and motor intact to right fingers   Neuro: GCS 15. CNII-XII grossly intact, alert and oriented to person, place, and time. Strength and motor function grossly intact to all extremities, sensation intact to all extremities.  Skin/Wounds:  warm, dry    Labs:  Pending  I have reviewed all pertinent lab results within the past 24 hours.    Diagnostic Results:  OSH Imaging:    CT Elbow Right:   1. There is a displaced comminuted fracture of the lateral epicondyle and capitellum of the humerus (image 30-35 series 10). There appears to be displacement of the capitellar fracture fragment anterior to the distal humeral shaft best seen on sagittal image 34 series 12. This is consistent with a complete articular fracture of the distal humerus with both intra-articular and metaphyseal involvement.  2. Details and findings as discussed above.     I have reviewed all pertinent imaging results/findings within the past 24 hours.    ASSESSMENT & PLAN:      Right distal humerus fracture  - Orthopedics consulted  - NWB RUE  - Sling for comfort  - Ice packs for swelling  - MMPC  - OR 6/27/25    Trip and fall  - NPO  - mIVF @ 100 ml/h  - Daily labs   - MM pain control  - Frequent IS  - Prophylactic Lovenox 40mg BID     Rebecca Perdomo, AGACNP-BC, FNP-BC  Trauma Surgery  [facility]  C: [phone]           [1]   Social History  Tobacco Use   Smoking Status Every Day    Current packs/day: 1.00    Average packs/day: 1 pack/day for 13.2 years (13.2 ttl pk-yrs)    Types: Cigarettes    Start date: 4/10/2012   Smokeless Tobacco Never   [2]   Current Facility-Administered Medications on File Prior to Encounter   Medication Dose Route Frequency Provider Last Rate Last Admin    [COMPLETED] HYDROmorphone (PF) injection 1 mg  1 mg Intramuscular ED 1 Time Khadijah Marx MD   1 mg at 06/26/25 2421    [COMPLETED] ketorolac injection 30 mg  30 mg Intramuscular ED 1 Time Araseli  Khadijah ACKERMAN MD   30 mg at 06/26/25 0218     Current Outpatient Medications on File Prior to Encounter   Medication Sig Dispense Refill    aspirin/salicylamide/caffeine (ARTHRITIS STRENGTH BC POWDER ORAL) Take by mouth. 3 packs in the am and 3 packs in the afternoon      gabapentin (NEURONTIN) 300 MG capsule Take 1 capsule (300 mg total) by mouth every evening. 30 capsule 3    hydroxychloroquine (PLAQUENIL) 200 mg tablet Take 1.5 tablets (300 mg total) by mouth once daily. After food 60 tablet 5    valsartan (DIOVAN) 80 MG tablet Take 1 tablet (80 mg total) by mouth every evening. 30 tablet 11

## 2025-06-27 NOTE — PT/OT/SLP EVAL
Physical Therapy Evaluation and Discharge Note    Patient Name:  Vimal Beasley   MRN:  [id number]    Recommendations:     Discharge therapy intensity: Low Intensity Therapy (pt will require outpt therapy when dr feels she is post operatively ready)   Discharge Equipment Recommendations:     Barriers to discharge: None    Assessment:     Vimal Beasley is a 50 y.o. female admitted with a medical diagnosis of right distal humerus fx s/p ORIF. .  At this time, patient is functioning at their prior level of function and does not require further acute PT services.     DME Justification:  No DME recommended requiring DME justifications    Recent Surgery: Procedure(s) (LRB):  OPEN TREATMENT, FRACTURE, HUMERUS, EPICONDYLE (Right)  ORIF, FRACTURE, HUMERUS, CONDYLE (Right) * Day of Surgery *    Plan:     During this hospitalization, patient does not require further acute PT services.  Please re-consult if situation changes.      Subjective     Chief Complaint:   Patient/Family Comments/goals:   Pain/Comfort:       Patients cultural, spiritual, Church conflicts given the current situation:      Living Environment:  Pt lives in a camper with a ramp alone but family is all around and she will not be alone during her recovery  Prior to admission, patients level of function was ind.  Equipment used at home:  .  DME owned (not currently used): .  Upon discharge, patient will have assistance from family.    Objective:     Communicated with nurse prior to session.  Patient found supine with   upon PT entry to room.    General Precautions: Standard,      Orthopedic Precautions:  NWB RUE/ROMAT  Braces:  Elbow brace on her at eval  Respiratory Status: Nasal cannula, flow   L/min  Blood Pressure:     Exams:  RLE ROM: WFL  RLE Strength: WFL  LLE ROM: WFL  LLE Strength: WFL    Functional Mobility:  Bed Mobility:     Supine to Sit: independence  Sit to Supine: independence  Transfers:     Sit to Stand:  independence with no AD  Bed to Chair:  independence with  no AD  using  Step Transfer  Gait: 150ft ind no ad    AM-PAC 6 CLICK MOBILITY  Total Score:      Co-Treatment: No    Treatment and Education:      Patient and family were provided with verbal education education regarding PT role/goals/POC, fall prevention, and safety awareness.  Understanding was verbalized.     Patient left supine with all lines intact and call button in reach.    GOALS:   Multidisciplinary Problems       Physical Therapy Goals       Not on file                    History:     Past Medical History:   Diagnosis Date    Arthritis     Back pain with right-sided sciatica 2023    Decreased hearing of both ears 04/10/2024    Depression     Mixed anxiety depressive disorder 2023    Primary hypertension 2023       Past Surgical History:   Procedure Laterality Date     SECTION      hip replacment Right     Leg Fracture repair         Time Tracking:     PT Received On:    PT Start Time: 1450     PT Stop Time: 1510  PT Total Time (min): 20 min     Billable Minutes: Evaluation 2025

## 2025-06-27 NOTE — ED PROVIDER NOTES
Encounter Date: 2025    SCRIBE #1 NOTE: I, Brett Bynum, am scribing for, and in the presence of,  Eduardo Zhu DO. I have scribed the following portions of the note - Other sections scribed: HPI,ROS,PE.       History     Chief Complaint   Patient presents with    Transfer     JACKY Pantoja from [place] for trauma services regarding R elbow Fx after a fall. Arrives in splint and sling.      49 y/o female with PMHx of HTN, anxiety, and depression presents to ED via [facility] as a transfer from [place] for a right elbow fracture following a fall. Pt in the ED reports she fell onto her right elbow, in the grass. She denies any head trauma or LOC. She complains of right elbow pain. She denies any other complaints.     Per transfer records:     CT right elbow w/o contrast Impression: There is a displaced comminuted fracture of the lateral epicondyle and capitellum of the humerus (image 30-35 series 10). There appears to be displacement of the capitellar fracture fragment anterior to the distal humeral shaft best seen on sagittal image 34 series 12. This is consistent with a complete articular fracture of the distal humerus with both intra-articular and metaphyseal involvement.      The history is provided by the patient, medical records and a relative.     Review of patient's allergies indicates:  No Known Allergies  Past Medical History:   Diagnosis Date    Arthritis     Back pain with right-sided sciatica 2023    Decreased hearing of both ears 04/10/2024    Depression     Mixed anxiety depressive disorder 2023    Primary hypertension 2023     Past Surgical History:   Procedure Laterality Date     SECTION      hip replacment Right     Leg Fracture repair       Family History   Problem Relation Name Age of Onset    Hypertension Mother      Diabetes Mellitus Mother      Cancer Mother      Cancer Father      Rheum arthritis Maternal Aunt      Rheum arthritis Maternal Grandmother       Social  History[1]  Review of Systems   Constitutional:  Negative for chills, diaphoresis, fatigue and fever.   HENT:  Negative for ear pain, facial swelling, hearing loss, nosebleeds and sore throat.    Eyes:  Negative for visual disturbance.   Respiratory:  Negative for shortness of breath.    Cardiovascular:  Negative for chest pain.   Gastrointestinal:  Negative for abdominal pain, blood in stool, nausea and vomiting.   Endocrine: Negative for polyuria.   Genitourinary:  Negative for dysuria and hematuria.   Musculoskeletal:  Positive for arthralgias (right elbow pain) and myalgias (right elbow pain). Negative for back pain and neck pain.   Skin:  Negative for color change, rash and wound.   Allergic/Immunologic: Negative for immunocompromised state.   Neurological:  Negative for dizziness, weakness and headaches.   Hematological:  Does not bruise/bleed easily.       Physical Exam     Initial Vitals [06/27/25 0339]   BP Pulse Resp Temp SpO2   (!) 139/97 89 16 97.7 °F (36.5 °C) 100 %      MAP       --         Physical Exam    Constitutional: She appears well-developed and well-nourished. She is cooperative.  Non-toxic appearance.   HENT:   Head: Normocephalic and atraumatic. Mouth/Throat: Uvula is midline, oropharynx is clear and moist and mucous membranes are normal.   Eyes: Conjunctivae and EOM are normal. Pupils are equal, round, and reactive to light.   Neck: Trachea normal and phonation normal. Neck supple.    Full passive range of motion without pain.     Cardiovascular:  Normal rate, regular rhythm, normal heart sounds, intact distal pulses and normal pulses.           No murmur heard.  Pulmonary/Chest: No accessory muscle usage. No tachypnea. No respiratory distress. She has no decreased breath sounds. She has no wheezes. She has no rhonchi. She has no rales.   Abdominal: Abdomen is soft. Bowel sounds are normal. She exhibits no distension. There is no abdominal tenderness. No hernia. There is no rebound and no  guarding.   Musculoskeletal:      Cervical back: Full passive range of motion without pain and neck supple.      Comments: Right arm in a splint and sling.      Neurological: She is alert and oriented to person, place, and time. GCS eye subscore is 4. GCS verbal subscore is 5. GCS motor subscore is 6.   Skin: Skin is warm, dry and intact. Capillary refill takes less than 2 seconds. No rash noted.   Psychiatric: She has a normal mood and affect. Her speech is normal and behavior is normal. Thought content normal.         ED Course   Procedures  Labs Reviewed - No data to display       Imaging Results    None          Medications   HYDROmorphone (PF) injection 0.5 mg (has no administration in time range)   methocarbamoL injection 1,000 mg (has no administration in time range)   gabapentin capsule 300 mg (has no administration in time range)   lactated ringers infusion (has no administration in time range)   enoxaparin injection 40 mg (has no administration in time range)   acetaminophen tablet 650 mg (has no administration in time range)   oxyCODONE immediate release tablet 5 mg (has no administration in time range)   oxyCODONE immediate release tablet Tab 10 mg (has no administration in time range)   methocarbamoL tablet 500 mg (has no administration in time range)   gabapentin capsule 300 mg (has no administration in time range)   melatonin tablet 6 mg (has no administration in time range)   polyethylene glycol packet 17 g (has no administration in time range)   docusate sodium capsule 100 mg (has no administration in time range)   magnesium hydroxide 400 mg/5 ml suspension 2,400 mg (has no administration in time range)     Medical Decision Making  50-year-old female presenting as a transfer for right capitellum and epicondyle fracture    Differential Diagnosis:   Judging by the patient's chief complaint and pertinent history, the patient has the following possible differential diagnoses, including but not limited  to the following.  Some of these are deemed to be lower likelihood and some more likely based on my physical exam and history combined with possible lab work and/or imaging studies.   Please see the pertinent studies, and refer to the HPI.  Some of these diagnoses will take further evaluation to fully rule out, perhaps as an outpatient and the patient was encouraged to follow up when discharged for more comprehensive evaluation    Fracture, dislocation, strain, sprain    Problems Addressed:  Closed displaced fracture of lateral epicondyle of right humerus, unspecified fracture morphology, initial encounter: acute illness or injury with systemic symptoms  Closed fracture of capitulum of right humerus, initial encounter: acute illness or injury with systemic symptoms  Ground-level fall: acute illness or injury with systemic symptoms    Amount and/or Complexity of Data Reviewed  External Data Reviewed: labs, radiology and notes.     Details: Per transfer records:     CT right elbow w/o contrast Impression: There is a displaced comminuted fracture of the lateral epicondyle and capitellum of the humerus (image 30-35 series 10). There appears to be displacement of the capitellar fracture fragment anterior to the distal humeral shaft best seen on sagittal image 34 series 12. This is consistent with a complete articular fracture of the distal humerus with both intra-articular and metaphyseal involvement.    Radiology: ordered and independent interpretation performed. Decision-making details documented in ED Course.    Risk  Prescription drug management.            Scribe Attestation:   Scribe #1: I performed the above scribed service and the documentation accurately describes the services I performed. I attest to the accuracy of the note.    Attending Attestation:           Physician Attestation for Scribe:  Physician Attestation Statement for Scribe #1: I, Eduardo Zhu, , reviewed documentation, as scribed by Brett  Fletcher in my presence, and it is both accurate and complete.             ED Course as of 06/27/25 0414   Fri Jun 27, 2025   7638 Case discussed with Dr. Ochoa with Orthopedics, who would like trauma surgery admission, NPO and we will need ORIF.  This was also discussed with Rebecca with Trauma surgery, who will evaluate patient and place admission.  Patient currently having reoccurrence of pain, she was given Toradol and Dilaudid prior to arrival with did help her pain.  I will give Robaxin, gabapentin and 0.5 of Dilaudid [MM]      ED Course User Index  [MM] Eduardo Zhu DO                           Clinical Impression:  Final diagnoses:  [S42.431A] Closed displaced fracture of lateral epicondyle of right humerus, unspecified fracture morphology, initial encounter (Primary)  [S42.451A] Closed fracture of capitulum of right humerus, initial encounter  [W18.30XA] Ground-level fall          ED Disposition Condition    Observation                       [1]   Social History  Tobacco Use    Smoking status: Every Day     Current packs/day: 1.00     Average packs/day: 1 pack/day for 13.2 years (13.2 ttl pk-yrs)     Types: Cigarettes     Start date: 4/10/2012    Smokeless tobacco: Never   Substance Use Topics    Alcohol use: Not Currently    Drug use: Never        Eduardo Zhu DO  06/27/25 0414

## 2025-06-27 NOTE — ANESTHESIA PROCEDURE NOTES
Intubation    Date/Time: 6/27/2025 9:48 AM    Performed by: Lorena Appiah CRNA  Authorized by: Lorena Appiah CRNA    Intubation:     Induction:  Intravenous    Intubated:  N/a    Mask Ventilation:  Easy with oral airway    Attempts:  1    Attempted By:  CRNA    Method of Intubation:  Direct    Blade:  Dumont 2    Laryngeal View Grade: Grade I - full view of cords      Difficult Airway Encountered?: No      Complications:  None    Airway Device:  Oral endotracheal tube    Airway Device Size:  7.0    Style/Cuff Inflation:  Cuffed    Tube secured:  21    Secured at:  The lips    Placement Verified By:  Capnometry    Complicating Factors:  None    Findings Post-Intubation:  BS equal bilateral and atraumatic/condition of teeth unchanged

## 2025-06-27 NOTE — TRANSFER OF CARE
"Anesthesia Transfer of Care Note    Patient: Vimal Beasley    Procedure(s) Performed: Procedure(s) (LRB):  ORIF, FRACTURE, DISTAL HUMERUS (Right)  OPEN TREATMENT, FRACTURE, HUMERUS, EPICONDYLE (Right)    Patient location: PACU    Anesthesia Type: general    Transport from OR: Transported from OR on 2-3 L/min O2 by NC with adequate spontaneous ventilation    Post pain: adequate analgesia    Post assessment: no apparent anesthetic complications and tolerated procedure well    Post vital signs: stable    Level of consciousness: awake and alert    Nausea/Vomiting: no nausea/vomiting    Complications: none    Transfer of care protocol was followed      Last vitals: Visit Vitals  /67 (BP Location: Left arm, Patient Position: Lying)   Pulse 78   Temp 36.8 °C (98.2 °F) (Skin)   Resp 12   Ht 5' 2" (1.575 m)   Wt 73 kg (161 lb)   LMP  (LMP Unknown)   SpO2 95%   Breastfeeding No   BMI 29.45 kg/m²     "

## 2025-06-27 NOTE — PLAN OF CARE
CM attempted to get Discharge Planning Assessment completed. Therapy in patient's room , working with patient. CM will attempt to get this done at a later time.R

## 2025-06-27 NOTE — PROGRESS NOTES
"   Trauma Surgery   Progress Note  Admit Date: 6/27/2025  HD#0  POD#* No surgery found *    Subjective:   Interval history:  NAEO   AF, VSS  NPO   OR today with ortho       Home Meds:   Current Outpatient Medications   Medication Instructions    aspirin/salicylamide/caffeine (ARTHRITIS STRENGTH BC POWDER ORAL) Take by mouth. 3 packs in the am and 3 packs in the afternoon    gabapentin (NEURONTIN) 300 mg, Oral, Nightly    hydroxychloroquine (PLAQUENIL) 300 mg, Oral, Daily, After food    valsartan (DIOVAN) 80 mg, Oral, Nightly      Scheduled Meds:   acetaminophen  650 mg Oral Q6H    docusate sodium  100 mg Oral BID    enoxparin  40 mg Subcutaneous Q12H (prophylaxis, 0900/2100)    gabapentin  300 mg Oral TID    methocarbamoL  500 mg Oral Q8H    polyethylene glycol  17 g Oral BID     Continuous Infusions:   lactated ringers   Intravenous Continuous 100 mL/hr at 06/27/25 0540 New Bag at 06/27/25 0540     PRN Meds:  Current Facility-Administered Medications:     magnesium hydroxide 400 mg/5 ml, 30 mL, Oral, Daily PRN    melatonin, 6 mg, Oral, Nightly PRN    oxyCODONE, 5 mg, Oral, Q4H PRN    oxyCODONE, 10 mg, Oral, Q4H PRN     Objective:     VITAL SIGNS: 24 HR MIN & MAX LAST   Temp  Min: 97.4 °F (36.3 °C)  Max: 97.9 °F (36.6 °C)  97.4 °F (36.3 °C)   BP  Min: 136/93  Max: 163/109  (!) 148/88    Pulse  Min: 76  Max: 99  81    Resp  Min: 16  Max: 18  18    SpO2  Min: 94 %  Max: 100 %  95 %      HT: 5' 2" (157.5 cm)  WT: 73 kg (161 lb)  BMI: 29.4     Intake/output:  Intake/Output - Last 3 Shifts       None          No intake or output data in the 24 hours ending 06/27/25 0705      Lines/drains/airway:  Peripheral IV Single Lumen 06/27/25 0406 20 G Anterior;Left;Proximal Forearm (Active)   Site Assessment Clean;Dry;Intact 06/27/25 0504   Extremity Assessment Distal to IV No abnormal discoloration;No redness;No swelling;No warmth 06/27/25 0504   Line Status Infusing 06/27/25 0504   Dressing Status Dry;Clean;Intact 06/27/25 0504 " "  Dressing Intervention Integrity maintained 06/27/25 0504   Number of days: 0       Physical examination:  Gen: NAD, AAOx3, answering questions appropriately  HEENT: normocephalic, EOMI   CV: RR  Resp: NWOB  Abd: S/NT/ND  Msk: moving all extremities spontaneously and purposefully, right upper extremity in splint and sling with sensation and motor intact to right fingers   Neuro: CN II-XII grossly intact  Skin/wounds: warm and dry     Labs:  Renal:  No results for input(s): "BUN", "CREATININE" in the last 72 hours.  No results for input(s): "LACTIC" in the last 72 hours.  FEN/GI:  No results for input(s): "NA", "K", "CL", "CO2", "CALCIUM", "MG", "PHOS", "PROT", "ALBUMIN", "BILITOT", "AST", "ALKPHOS", "ALT" in the last 72 hours.  Heme:  Recent Labs     06/27/25  0627   HGB 12.4   HCT 41.8        ID:  Recent Labs     06/27/25  0627   WBC 8.65     CBG:  No results for input(s): "GLUCOSE" in the last 72 hours.   No results for input(s): "POCTGLUCOSE" in the last 72 hours.   Cardiovascular:  No results for input(s): "TROPONINI", "CKTOTAL", "CKMB", "BNP" in the last 168 hours.  I have reviewed all pertinent lab results within the past 24 hours.    Imaging:  CT Previous   Final Result      Xray Previous   Final Result      CT 3D Rendering WO Independent Workstation    (Results Pending)      I have reviewed all pertinent imaging results/findings within the past 24 hours.    Micro/Path/Other:  Microbiology Results (last 7 days)       ** No results found for the last 168 hours. **           Pathology Results  (Last 7 days)      None             Assessment & Plan:   Right distal humerus fracture  - Orthopedics consulted  - NWB RUE  - Sling for comfort  - Ice packs for swelling  - MMPC  - OR 6/27/25     Trip and fall  - NPO  - mIVF @ 100 ml/h  - Daily labs   - MM pain control  - Frequent IS  - Prophylactic Lovenox 40mg BID     Coty Pino MD  [employer] [facility]  7:05 AM      "

## 2025-06-27 NOTE — CONSULTS
[facility]  Orthopedic Trauma  Consult Note    Patient Name: Vimal Beasley  MRN: [id number]  Admission Date: 2025  Hospital Length of Stay: 0 days  Attending Provider: Ashok Witt Jr., *  Primary Care Provider: Laura, Primary Doctor        Inpatient consult to Orthopedic Surgery  Consult performed by: Ishaan Hernandez DO  Consult ordered by: Rebecca Perdomo NP        Subjective:         Chief Complaint:   Chief Complaint   Patient presents with    Transfer     [facility] Tx from [place] for trauma services regarding R elbow Fx after a fall. Arrives in splint and sling.         HPI:  Patient is a transfer him from outside facility due to a distal humerus fracture.  She has significant smoking history.  She has tripped and fell onto her right elbow and has a large articular fragment.    Past Medical History:   Diagnosis Date    Arthritis     Back pain with right-sided sciatica 2023    Decreased hearing of both ears 04/10/2024    Depression     Mixed anxiety depressive disorder 2023    Primary hypertension 2023       Past Surgical History:   Procedure Laterality Date     SECTION      hip replacment Right     Leg Fracture repair         Review of patient's allergies indicates:  No Known Allergies    Current Facility-Administered Medications   Medication    acetaminophen tablet 650 mg    docusate sodium capsule 100 mg    enoxaparin injection 40 mg    gabapentin capsule 300 mg    lactated ringers infusion    magnesium hydroxide 400 mg/5 ml suspension 2,400 mg    melatonin tablet 6 mg    methocarbamoL tablet 500 mg    oxyCODONE immediate release tablet 5 mg    oxyCODONE immediate release tablet Tab 10 mg    polyethylene glycol packet 17 g     Family History       Problem Relation (Age of Onset)    Cancer Mother, Father    Diabetes Mellitus Mother    Hypertension Mother    Rheum arthritis Maternal Aunt, Maternal Grandmother          Tobacco Use    Smoking status:  "Every Day     Current packs/day: 1.00     Average packs/day: 1 pack/day for 13.2 years (13.2 ttl pk-yrs)     Types: Cigarettes     Start date: 4/10/2012    Smokeless tobacco: Never   Substance and Sexual Activity    Alcohol use: Not Currently    Drug use: Never    Sexual activity: Yes     Partners: Male       ROS:  Constitutional: Denies fever chills  Eyes: No change in vision  ENT: No ringing or current infections  CV: No chest pain  Resp: No labored breathing  MSK: Pain evident at site of injury located in HPI,   Integ: No signs of abrasions or lacerations  Neuro: No numbness or tingling  Lymphatic: No swelling outside the area of injury   Objective:     Vital Signs (Most Recent):  Temp: 97.4 °F (36.3 °C) (06/27/25 0700)  Pulse: 81 (06/27/25 0700)  Resp: 18 (06/27/25 0700)  BP: (!) 148/88 (06/27/25 0700)  SpO2: 95 % (06/27/25 0700) Vital Signs (24h Range):  Temp:  [97.4 °F (36.3 °C)-97.9 °F (36.6 °C)] 97.4 °F (36.3 °C)  Pulse:  [76-99] 81  Resp:  [16-18] 18  SpO2:  [94 %-100 %] 95 %  BP: (136-163)/() 148/88     Weight: 73 kg (161 lb)  Height: 5' 2" (157.5 cm)  Body mass index is 29.45 kg/m².    No intake or output data in the 24 hours ending 06/27/25 0710    Ortho/SPM Exam  General the patient is alert and oriented x3 no acute distress nontoxic-appearing appropriate affect.    Constitutional: Vital signs are examined and stable.  Resp: No signs of labored breathing      RUE: -Skin:  No signs of new abrasions or lacerations, no scars           -MSK: STR 5/5 AIN/PIN/Median/Radial/Ulnar motor,           -Neuro:  Sensation intact to light touch C5-T1 dermatomes           -Lymphatic: No signs of  lymphadenopathy,            -CV:  Capillary refill is less than 2 seconds. Radial and ulnar pulses 2/4. Compartments soft and compressible    Significant Labs:  I have reviewed all labs in relation to Orthopedics  Recent Lab Results         06/27/25  0627        Baso # 0.04       Basophil % 0.5       Eos # 0.24       Eos " % 2.8       Hematocrit 41.8       Hemoglobin 12.4       Immature Grans (Abs) 0.02       Immature Granulocytes 0.2       Lymph # 2.22       LYMPH % 25.7       MCH 24.1       MCHC 29.7       MCV 81.2       Mono # 0.67       Mono % 7.7       MPV 9.5       Neut # 5.46       Neut % 63.1       nRBC 0.0       Platelet Count 323       RBC 5.15       RDW 19.3       WBC 8.65               Significant Imaging: I have reviewed all pertinent imaging results/findings.  CT Arm Elbow Without Contrast Right  Result Date: 6/27/2025  START OF REPORT: Technique: CT of the right arm and elbow was performed without intravenous contrast with direct axial as well as sagittal and coronal reformations. Comparison: Comparison is with study dated 2025-06-26 21:48:46. Clinical history: Rt elbow pain from fall. Findings: Upper arm: The visualized structures of the upper arm appear unremarkable. Elbow: There is a displaced comminuted fracture of the lateral epicondyle and capitellum of the humerus (image 30-35 series 10). There appears to be displacement of the capitellar fracture fragment anterior to the distal humeral shaft best seen on sagittal image 34 series 12. This is consistent with a complete articular fracture of the distal humerus with both intra-articular and metaphyseal involvement. Forearm: The visualized structures of the forearm appear unremarkable. Impression: 1. There is a displaced comminuted fracture of the lateral epicondyle and capitellum of the humerus (image 30-35 series 10). There appears to be displacement of the capitellar fracture fragment anterior to the distal humeral shaft best seen on sagittal image 34 series 12. This is consistent with a complete articular fracture of the distal humerus with both intra-articular and metaphyseal involvement. 2. Details and findings as discussed above.        Assessment/Plan:     Active Diagnoses:    Diagnosis Date Noted POA    PRINCIPAL PROBLEM:  Displaced fracture (avulsion) of  lateral epicondyle of right humerus, initial encounter for closed fracture [S42.431A] 06/27/2025 Yes      Problems Resolved During this Admission:       Independent Radiology ordered by other provider:   CT scan of the right elbow two views right elbow showing a severe injury to the distal humerus involving the articular surface of the anterior aspect of the distal humerus involving both the ulnar humeral joint and the radial humeral joint.    Pt has acute injury with risk of severe bodily function with their injury to the right elbow.  -Risks included with this type of injury loss of range of motion possible need for fusion chronic pain        Patient is a 50-year-old smoker.  She has severe injury to the right distal humerus involving the elbow joint.  He has a has a cartilage lesion and displaced fragment encompassing nearly the entire aspect of the anterior distal humerus.  This is a severe injury that is a small fragment appears to be thin without significant subchondral bone.  High risk of necrosis.  High risk of complications including needing fusion in the future.    Patient does have a significant smoking history.  She understands that smoking will likely lead to almost guarantee necrosis of the fragment.  She states that she will stop.    OR today    I explained that surgery and the nature of their condition are not without risks. These include, but are not limited to, bleeding, infection, neurovascular compromise, malunion, nonunion, hardware complications, wound complications, scarring, cosmetic defects, need for later and/or repeated surgeries, avascular necrosis, bone death due to initial trauma, pain, loss of ROM, loss of function, PTOA, deformity, stance/gait and/or functional abnormalities, thromboembolic complications, compartment syndrome, loss of limb, loss of life, anesthetic complications, and other imponderables. I explained that these can occur despite the adequacy of treatments rendered, and  that their risks are heightened given the nature of their condition.  I have also discussed the importance not using nicotine products due to the increased risk of infection surgical wound healing complications and nonunion of the fracture.  They verbalized understanding.  No guarantees were made.  They would like to continue with surgery at this time. If appropriate family was involved with surgical discussion.             This note/OR report was created with the assistance of  voice recognition software or phone  dictation.  There may be transcription errors as a result of using this technology however minimal. Effort has been made to assure accuracy of transcription but any obvious errors or omissions should be clarified with the author of the document.       Ishaan Hernandez, DO   Orthopedic Trauma Surgery  [facility]

## 2025-06-27 NOTE — ED PROVIDER NOTES
Encounter Date: 2025  History from patient     History     Chief Complaint   Patient presents with    Arm Injury     Pt c/o right arm pain after a trip and fall onto grass. Radial pulses and sensations present. Limited range of motion due to pain. Denies head strike.       HPI    Vimal Beasley is 50 y.o. female who  has a past medical history of Arthritis, Back pain with right-sided sciatica (2023), Decreased hearing of both ears (04/10/2024), Depression, Mixed anxiety depressive disorder (2023), and Primary hypertension (2023). arrives in ER with c/o Arm Injury (Pt c/o right arm pain after a trip and fall onto grass. Radial pulses and sensations present. Limited range of motion due to pain. Denies head strike.  )    Review of patient's allergies indicates:  No Known Allergies  Past Medical History:   Diagnosis Date    Arthritis     Back pain with right-sided sciatica 2023    Decreased hearing of both ears 04/10/2024    Depression     Mixed anxiety depressive disorder 2023    Primary hypertension 2023     Past Surgical History:   Procedure Laterality Date     SECTION      hip replacment Right     Leg Fracture repair       Family History   Problem Relation Name Age of Onset    Hypertension Mother      Diabetes Mellitus Mother      Cancer Mother      Cancer Father      Rheum arthritis Maternal Aunt      Rheum arthritis Maternal Grandmother       Social History[1]  Review of Systems   Constitutional:  Negative for fever.   HENT:  Negative for trouble swallowing and voice change.    Eyes:  Negative for visual disturbance.   Respiratory:  Negative for cough and shortness of breath.    Cardiovascular:  Negative for chest pain.   Gastrointestinal:  Negative for abdominal pain, diarrhea and vomiting.   Genitourinary:  Negative for dysuria and hematuria.   Musculoskeletal:  Negative for back pain and gait problem.        Right elbow pain and decreased range of motion after the  fall   Skin:  Negative for color change and rash.   Neurological:  Negative for headaches.   Psychiatric/Behavioral:  Negative for behavioral problems and sleep disturbance.    All other systems reviewed and are negative.      Physical Exam     Initial Vitals [06/26/25 2132]   BP Pulse Resp Temp SpO2   (!) 136/93 99 18 97.9 °F (36.6 °C) 97 %      MAP       --         Physical Exam    Nursing note and vitals reviewed.  Constitutional: No distress.   HENT:   Head: Atraumatic.   Cardiovascular:  Normal rate and regular rhythm.           Pulmonary/Chest: Breath sounds normal. No respiratory distress.   Abdominal: Abdomen is soft. Bowel sounds are normal.   Musculoskeletal:         General: Normal range of motion.        Arms:       Comments: Tenderness of the medial epicondyle, some tenderness on the olecranon and lateral epicondyle also.  No tenderness in the shoulder, no particular tenderness in the hand or wrist or forearm.     Neurological: She is alert and oriented to person, place, and time.   Skin: Skin is warm and dry.   Psychiatric: She has a normal mood and affect.         ED Course   Procedures  Labs Reviewed - No data to display       Imaging Results              CT Arm Elbow Without Contrast Right (Preliminary result)  Result time 06/27/25 00:28:48      Preliminary result by Doc Pickering MD (06/27/25 00:28:48)                   Narrative:    START OF REPORT:  Technique: CT of the right arm and elbow was performed without intravenous contrast with direct axial as well as sagittal and coronal reformations.    Comparison: Comparison is with study dated 2025-06-26 21:48:46.    Clinical history: Rt elbow pain from fall.    Findings:  Upper arm: The visualized structures of the upper arm appear unremarkable.  Elbow: There is a displaced comminuted fracture of the lateral epicondyle and capitellum of the humerus (image 30-35 series 10). There appears to be displacement of the capitellar fracture fragment  anterior to the distal humeral shaft best seen on sagittal image 34 series 12. This is consistent with a complete articular fracture of the distal humerus with both intra-articular and metaphyseal involvement.  Forearm: The visualized structures of the forearm appear unremarkable.      Impression:  1. There is a displaced comminuted fracture of the lateral epicondyle and capitellum of the humerus (image 30-35 series 10). There appears to be displacement of the capitellar fracture fragment anterior to the distal humeral shaft best seen on sagittal image 34 series 12. This is consistent with a complete articular fracture of the distal humerus with both intra-articular and metaphyseal involvement.  2. Details and findings as discussed above.                                         X-Ray Elbow Complete Right (In process)  Result time 06/26/25 21:55:48                     Medications   ketorolac injection 30 mg (30 mg Intramuscular Given 6/26/25 5528)   HYDROmorphone (PF) injection 1 mg (1 mg Intramuscular Given 6/26/25 4659)     Medical Decision Making    Vimal Beasley is 50 y.o. female who  has a past medical history of Arthritis, Back pain with right-sided sciatica (06/08/2023), Decreased hearing of both ears (04/10/2024), Depression, Mixed anxiety depressive disorder (02/17/2023), and Primary hypertension (02/17/2023). arrives in ER with c/o Arm Injury (Pt c/o right arm pain after a trip and fall onto grass. Radial pulses and sensations present. Limited range of motion due to pain. Denies head strike.  )    Patient says she tripped in the grass and fell onto her right elbow and has been hurting in the elbow and can not move it.  It hurts in the elbow even when she moves the fingers in the wrist    Amount and/or Complexity of Data Reviewed  Radiology: ordered.    Risk  Prescription drug management.               ED Course as of 06/27/25 0142   Fri Jun 27, 2025   0051 Since patient has a fracture of the elbow, and it is  difficult to get an x-ray on her and it appears to be displaced fracture decided to do a CT scan of the elbow, CTA elbow shows that patient has intra articular fracture of the humerus with lateral epicondyle fracture which is displaced, I talked to Dr. John and he is going to look at the CT scan and call me back. [GQ]   0054 Dr. John called back, and said that the fracture which she has is kind of out of his expertise and he feels that patient needs to go to Trauma surgeon for evaluation.  I am going to put the patient in the portal for possible transfer to [place] for trauma evaluation. [GQ]   0057 After Dilaudid patient is feeling comfortable, it is still has pain on minimal movement of her elbow.  I will put her in the portal for possible transfer for trauma evaluation.  For a comminuted distal humerus/elbow/lateral epicondyle fracture. [GQ]   0114 Patient is accepted by Dr Paula Strong, we will apply splint to the right arm and will send her to [facility] ER by ambulance. [GQ]   0115 I talked to patient and informed her that I am going to transfer her over to [facility] Emergency room for evaluation by Trauma and if they feel that patient needs surgery with in the next 24 hours they will admit her and do the surgery in in case they knee feel that patient can be treated as outpatient they will probably let her go home and ask her to go over there for outpatient surgery.  Patient verbalized understanding, patient's family member is going to go there with the  by their own car with the patient in the ambulance.  I will send her to [place] after splinting her. [GQ]      ED Course User Index  [GQ] Khadijah Marx MD            .: Patient refused recommended mode of transport, explained increased risk.              Clinical Impression:  Final diagnoses:  [W19.XXXA, Y92.009] Fall at home, initial encounter  [S42.206P] Other closed displaced fracture of  distal end of right humerus, initial encounter - Intraarticular (Primary)  [S42.431A] Displaced fracture (avulsion) of lateral epicondyle of right humerus, initial encounter for closed fracture          ED Disposition Condition    Transfer to Another Facility Stable                      [1]   Social History  Tobacco Use    Smoking status: Every Day     Current packs/day: 1.00     Average packs/day: 1 pack/day for 13.2 years (13.2 ttl pk-yrs)     Types: Cigarettes     Start date: 4/10/2012    Smokeless tobacco: Never   Substance Use Topics    Alcohol use: Not Currently    Drug use: Never        Khadijah Marx MD  06/27/25 0142

## 2025-06-27 NOTE — ANESTHESIA PREPROCEDURE EVALUATION
06/27/2025  Vimal Beasley is a 50 y.o., female.  Procedure Information    Case: [id number] Date/Time: 06/27/25 0919   Procedure: ORIF, FRACTURE, DISTAL HUMERUS (Right: Arm Upper) - lateral vascular backward bean back skeletal dynamics. possible supine hand table c ar,   Anesthesia type: General   Diagnosis: Closed displaced fracture of lateral epicondyle of right humerus, unspecified fracture morphology, initial encounter [S42.431A]   Pre-op diagnosis: Closed displaced fracture of lateral epicondyle of right humerus, unspecified fracture morphology, initial encounter [S42.431A]   Location: [facility] OR [address] [place] [address] OR   Surgeons: Ishaan Hernandez DO       Pre-op Assessment    I have reviewed the Patient Summary Reports.     I have reviewed the Nursing Notes. I have reviewed the NPO Status.   I have reviewed the Medications.     Review of Systems  Anesthesia Hx:  No problems with previous Anesthesia               Denies Personal Hx of Anesthesia complications.                    Hematology/Oncology:  Hematology Normal   Oncology Normal                                   EENT/Dental:  EENT/Dental Normal           Cardiovascular:  Exercise tolerance: good   Hypertension                  Functional Capacity good / => 4 METS                         Pulmonary:  Pulmonary Normal                       Renal/:   Denies Chronic Renal Disease.                Hepatic/GI:  Hepatic/GI Normal                    Musculoskeletal:  Musculoskeletal Normal                Neurological:      Headaches                                 Endocrine:  Endocrine Normal          Denies Morbid Obesity / BMI > 40  Dermatological:  Skin Normal    Psych:   anxiety                 Physical Exam  General: Alert, Oriented, Well nourished and Cooperative    Airway:  Mallampati: II   Mouth Opening: Normal  TM Distance: Normal  Tongue: Normal  Neck ROM: Normal  ROM    Dental:  Intact    Chest/Lungs:  Clear to auscultation, Normal Respiratory Rate    Heart:  Rate: Normal  Rhythm: Regular Rhythm       Latest Reference Range & Units 06/27/25 06:27   WBC 4.50 - 11.50 x10(3)/mcL 8.65   RBC 4.20 - 5.40 x10(6)/mcL 5.15   Hemoglobin 12.0 - 16.0 g/dL 12.4   Hematocrit 37.0 - 47.0 % 41.8   MCV 80.0 - 94.0 fL 81.2   MCH 27.0 - 31.0 pg 24.1 (L)   MCHC 33.0 - 36.0 g/dL 29.7 (L)   RDW 11.5 - 17.0 % 19.3 (H)   Platelet Count 130 - 400 x10(3)/mcL 323   MPV 7.4 - 10.4 fL 9.5   Neut % % 63.1   LYMPH % % 25.7   Mono % % 7.7   Eos % % 2.8   Basophil % % 0.5   Immature Granulocytes % 0.2   Neut # 2.1 - 9.2 x10(3)/mcL 5.46   Lymph # 0.6 - 4.6 x10(3)/mcL 2.22   Mono # 0.1 - 1.3 x10(3)/mcL 0.67   Eos # 0 - 0.9 x10(3)/mcL 0.24   Baso # <=0.2 x10(3)/mcL 0.04   Immature Grans (Abs) 0.00 - 0.04 x10(3)/mcL 0.02   nRBC % 0.0   Sodium 136 - 145 mmol/L 138   Potassium 3.5 - 5.1 mmol/L 3.8   Chloride 98 - 107 mmol/L 108 (H)   CO2 22 - 29 mmol/L 22   Anion Gap mEq/L 8.0   BUN 9.8 - 20.1 mg/dL 15.6   Creatinine 0.55 - 1.02 mg/dL 0.82   BUN/CREAT RATIO  19   eGFR mL/min/1.73/m2 >60   Glucose 74 - 100 mg/dL 80   Calcium 8.4 - 10.2 mg/dL 8.6   ALP 40 - 150 unit/L 136   PROTEIN TOTAL 6.4 - 8.3 gm/dL 6.5   Albumin 3.5 - 5.0 g/dL 3.1 (L)   Albumin/Globulin Ratio 1.1 - 2.0 ratio 0.9 (L)   BILIRUBIN TOTAL <=1.5 mg/dL 0.2   AST 11 - 45 unit/L 21   ALT 0 - 55 unit/L 18   Globulin, Total 2.4 - 3.5 gm/dL 3.4   (L): Data is abnormally low  (H): Data is abnormally high    Anesthesia Plan  Type of Anesthesia, risks & benefits discussed:    Anesthesia Type: Gen ETT  Intra-op Monitoring Plan: Standard ASA Monitors  Post Op Pain Control Plan: multimodal analgesia  Induction:  IV and Inhalation  Airway Plan: Direct  Informed Consent: Informed consent signed with the Patient and all parties understand the risks and agree with anesthesia plan.  All questions answered. Patient consented to blood products? Yes  ASA Score: 2  Day  of Surgery Review of History & Physical: H&P Update referred to the surgeon/provider.I have interviewed and examined the patient. I have reviewed the patient's H&P dated: There are no significant changes.   Anesthesia Plan Notes: Rt supraclavicular blk for po pain    Ready For Surgery From Anesthesia Perspective.     .

## 2025-06-28 VITALS
SYSTOLIC BLOOD PRESSURE: 139 MMHG | TEMPERATURE: 98 F | DIASTOLIC BLOOD PRESSURE: 90 MMHG | HEART RATE: 72 BPM | BODY MASS INDEX: 29.63 KG/M2 | OXYGEN SATURATION: 93 % | HEIGHT: 62 IN | RESPIRATION RATE: 20 BRPM | WEIGHT: 161 LBS

## 2025-06-28 LAB
ALBUMIN SERPL-MCNC: 2.9 G/DL (ref 3.5–5)
ALBUMIN/GLOB SERPL: 1 RATIO (ref 1.1–2)
ALP SERPL-CCNC: 129 UNIT/L (ref 40–150)
ALT SERPL-CCNC: 12 UNIT/L (ref 0–55)
ANION GAP SERPL CALC-SCNC: 6 MEQ/L
AST SERPL-CCNC: 17 UNIT/L (ref 11–45)
BASOPHILS # BLD AUTO: 0.04 X10(3)/MCL
BASOPHILS NFR BLD AUTO: 0.4 %
BILIRUB SERPL-MCNC: 0.1 MG/DL
BUN SERPL-MCNC: 18 MG/DL (ref 9.8–20.1)
CALCIUM SERPL-MCNC: 8.2 MG/DL (ref 8.4–10.2)
CHLORIDE SERPL-SCNC: 108 MMOL/L (ref 98–107)
CO2 SERPL-SCNC: 23 MMOL/L (ref 22–29)
CREAT SERPL-MCNC: 0.7 MG/DL (ref 0.55–1.02)
CREAT/UREA NIT SERPL: 26
EOSINOPHIL # BLD AUTO: 0.12 X10(3)/MCL (ref 0–0.9)
EOSINOPHIL NFR BLD AUTO: 1.1 %
ERYTHROCYTE [DISTWIDTH] IN BLOOD BY AUTOMATED COUNT: 18.5 % (ref 11.5–17)
GFR SERPLBLD CREATININE-BSD FMLA CKD-EPI: >60 ML/MIN/1.73/M2
GLOBULIN SER-MCNC: 2.9 GM/DL (ref 2.4–3.5)
GLUCOSE SERPL-MCNC: 169 MG/DL (ref 74–100)
HCT VFR BLD AUTO: 36.1 % (ref 37–47)
HGB BLD-MCNC: 10.7 G/DL (ref 12–16)
IMM GRANULOCYTES # BLD AUTO: 0.04 X10(3)/MCL (ref 0–0.04)
IMM GRANULOCYTES NFR BLD AUTO: 0.4 %
LYMPHOCYTES # BLD AUTO: 1.84 X10(3)/MCL (ref 0.6–4.6)
LYMPHOCYTES NFR BLD AUTO: 16.6 %
MCH RBC QN AUTO: 24.1 PG (ref 27–31)
MCHC RBC AUTO-ENTMCNC: 29.6 G/DL (ref 33–36)
MCV RBC AUTO: 81.3 FL (ref 80–94)
MONOCYTES # BLD AUTO: 0.96 X10(3)/MCL (ref 0.1–1.3)
MONOCYTES NFR BLD AUTO: 8.7 %
NEUTROPHILS # BLD AUTO: 8.09 X10(3)/MCL (ref 2.1–9.2)
NEUTROPHILS NFR BLD AUTO: 72.8 %
NRBC BLD AUTO-RTO: 0 %
PLATELET # BLD AUTO: 313 X10(3)/MCL (ref 130–400)
PMV BLD AUTO: 9.9 FL (ref 7.4–10.4)
POTASSIUM SERPL-SCNC: 4.1 MMOL/L (ref 3.5–5.1)
PROT SERPL-MCNC: 5.8 GM/DL (ref 6.4–8.3)
RBC # BLD AUTO: 4.44 X10(6)/MCL (ref 4.2–5.4)
SODIUM SERPL-SCNC: 137 MMOL/L (ref 136–145)
WBC # BLD AUTO: 11.09 X10(3)/MCL (ref 4.5–11.5)

## 2025-06-28 PROCEDURE — 85025 COMPLETE CBC W/AUTO DIFF WBC: CPT

## 2025-06-28 PROCEDURE — G0378 HOSPITAL OBSERVATION PER HR: HCPCS

## 2025-06-28 PROCEDURE — 96372 THER/PROPH/DIAG INJ SC/IM: CPT

## 2025-06-28 PROCEDURE — 63600175 PHARM REV CODE 636 W HCPCS

## 2025-06-28 PROCEDURE — 36415 COLL VENOUS BLD VENIPUNCTURE: CPT

## 2025-06-28 PROCEDURE — 63600175 PHARM REV CODE 636 W HCPCS: Performed by: PHYSICIAN ASSISTANT

## 2025-06-28 PROCEDURE — 97165 OT EVAL LOW COMPLEX 30 MIN: CPT

## 2025-06-28 PROCEDURE — 80053 COMPREHEN METABOLIC PANEL: CPT

## 2025-06-28 PROCEDURE — 25000003 PHARM REV CODE 250

## 2025-06-28 PROCEDURE — 99238 HOSP IP/OBS DSCHRG MGMT 30/<: CPT | Mod: ,,, | Performed by: SURGERY

## 2025-06-28 RX ORDER — METHOCARBAMOL 500 MG/1
500 TABLET, FILM COATED ORAL EVERY 8 HOURS
Qty: 30 TABLET | Refills: 0 | Status: SHIPPED | OUTPATIENT
Start: 2025-06-28 | End: 2025-06-28

## 2025-06-28 RX ORDER — OXYCODONE HYDROCHLORIDE 5 MG/1
5 TABLET ORAL EVERY 4 HOURS PRN
Qty: 20 TABLET | Refills: 0 | Status: SHIPPED | OUTPATIENT
Start: 2025-06-28 | End: 2025-06-28

## 2025-06-28 RX ORDER — GABAPENTIN 300 MG/1
300 CAPSULE ORAL 3 TIMES DAILY
Qty: 90 CAPSULE | Refills: 11 | Status: SHIPPED | OUTPATIENT
Start: 2025-06-28 | End: 2026-06-28

## 2025-06-28 RX ORDER — ACETAMINOPHEN 325 MG/1
650 TABLET ORAL EVERY 6 HOURS
Qty: 30 TABLET | Refills: 0 | Status: SHIPPED | OUTPATIENT
Start: 2025-06-28

## 2025-06-28 RX ORDER — OXYCODONE HYDROCHLORIDE 5 MG/1
5 TABLET ORAL EVERY 4 HOURS PRN
Qty: 20 TABLET | Refills: 0 | Status: SHIPPED | OUTPATIENT
Start: 2025-06-28

## 2025-06-28 RX ORDER — METHOCARBAMOL 500 MG/1
500 TABLET, FILM COATED ORAL EVERY 8 HOURS
Qty: 30 TABLET | Refills: 0 | Status: SHIPPED | OUTPATIENT
Start: 2025-06-28 | End: 2025-07-08

## 2025-06-28 RX ORDER — GABAPENTIN 300 MG/1
300 CAPSULE ORAL 3 TIMES DAILY
Qty: 90 CAPSULE | Refills: 11 | Status: SHIPPED | OUTPATIENT
Start: 2025-06-28 | End: 2025-06-28

## 2025-06-28 RX ORDER — ACETAMINOPHEN 325 MG/1
650 TABLET ORAL EVERY 6 HOURS
Qty: 30 TABLET | Refills: 0 | Status: SHIPPED | OUTPATIENT
Start: 2025-06-28 | End: 2025-06-28

## 2025-06-28 RX ADMIN — ACETAMINOPHEN 650 MG: 325 TABLET ORAL at 12:06

## 2025-06-28 RX ADMIN — CEFAZOLIN SODIUM 2 G: 2 SOLUTION INTRAVENOUS at 05:06

## 2025-06-28 RX ADMIN — GABAPENTIN 300 MG: 300 CAPSULE ORAL at 09:06

## 2025-06-28 RX ADMIN — ENOXAPARIN SODIUM 40 MG: 40 INJECTION SUBCUTANEOUS at 09:06

## 2025-06-28 RX ADMIN — Medication 6 MG: at 02:06

## 2025-06-28 RX ADMIN — DOCUSATE SODIUM 100 MG: 100 CAPSULE, LIQUID FILLED ORAL at 09:06

## 2025-06-28 RX ADMIN — ACETAMINOPHEN 650 MG: 325 TABLET ORAL at 06:06

## 2025-06-28 RX ADMIN — OXYCODONE HYDROCHLORIDE 10 MG: 10 TABLET ORAL at 06:06

## 2025-06-28 RX ADMIN — METHOCARBAMOL 500 MG: 500 TABLET ORAL at 06:06

## 2025-06-28 RX ADMIN — ACETAMINOPHEN 650 MG: 325 TABLET ORAL at 02:06

## 2025-06-28 RX ADMIN — OXYCODONE HYDROCHLORIDE 10 MG: 10 TABLET ORAL at 10:06

## 2025-06-28 RX ADMIN — OXYCODONE HYDROCHLORIDE 10 MG: 10 TABLET ORAL at 02:06

## 2025-06-28 NOTE — PT/OT/SLP EVAL
Occupational Therapy   Evaluation and Discharge Note    Name: Vimal Beasley  MRN: [id number]  Recent Surgery: Procedure(s) (LRB):  OPEN TREATMENT, FRACTURE, HUMERUS, EPICONDYLE (Right)  ORIF, FRACTURE, HUMERUS, CONDYLE (Right) 1 Day Post-Op    Recommendations:     Discharge therapy intensity: Low Intensity Therapy   Discharge Equipment Recommendations: none  Barriers to discharge:  None    Assessment:     Vimal Beasley is a 50 y.o. female with a medical diagnosis of right distal humerus fx s/p ORIF 6/27.     On eval, patient presents with edema in R hand; encouraged to move RUE in all planes including digits. She is able to mckenna B socks sitting EOB and ambulate to/from toilet without difficulty. She has a daughter that lives nearby that will be assisting her upon discharge. Skilled acute OT services are not warranted at this time.     Plan:     OT to sign off as acute OT services are not warranted at this time.  Please re-consult if situation changes during this hospitalization.    Plan of Care Reviewed with: patient, daughter    Subjective     Chief Complaint: pain in RUE  Patient/Family Comments/goals: go home    Occupational Profile:  Living Environment: lives alone; tub combo  Previous level of function: indep with ADLs and IADLs  Roles and Routines: house work  Equipment Used at Home: none  Assistance upon Discharge: daughter as needed    Pain/Comfort:  Pain Rating 1: 0/10    Patients cultural, spiritual, Adventism conflicts given the current situation: no    Objective:     OT communicated with nurse prior to session.      Patient was found HOB elevated with peripheral IV, telemetry upon OT entry to room.    General Precautions: Standard, fall  Orthopedic Precautions: RUE non weight bearing (RUE ROMAT)  Braces: UE brace    Vital Signs: Respiratory Status: on room air    Functional Mobility/Transfers:  Bed mobility:    Supine to Sit: independence  Sit to Supine: independence  Transfers: Sit to Stand: independence with  no AD  Toilet Transfer: independence with no AD using Step Transfer    Activities of Daily Living:  Lower Body Dressing: contact guard assistance mckenna B socks sitting EOB    AMPA 6 Click ADL:  AMPA Total Score: 20    Functional Cognition:  Affect: Cooperative    Visual Perceptual Skills:  Intact    Upper Extremity Function:  Right Upper Extremity:   Range of Motion: WFL at shoulder and digits  Strength: 3-/5 gross    Left Upper Extremity:  WFL    Balance:   Intact    Therapeutic Positioning  Risk for acquired pressure injuries is decreased due to intact sensation, continence, and ability to mobilize independently .    OT interventions performed during the course of today's session in an effort to prevent and/or reduce acquired pressure injuries:   Education was provided on benefits of and recommendations for therapeutic positioning    Skin assessment: full body skin assessment was performed    Findings: no redness or breakdown noted    OT recommendations for therapeutic positioning throughout hospitalization:   Follow [facility] Pressure Injury Prevention Protocol      Patient Education:  Patient and daughter/s were provided with verbal education and demonstrations education regarding OT role/goals/POC, post op precautions, fall prevention, safety awareness, Discharge/DME recommendations, and pressure ulcer prevention.  Understanding was verbalized, however additional teaching warranted.     Patient left HOB elevated with all lines intact, call button in reach, nurse notified, and daughter present.    History:     Past Medical History:   Diagnosis Date    Arthritis     Back pain with right-sided sciatica 2023    Decreased hearing of both ears 04/10/2024    Depression     Mixed anxiety depressive disorder 2023    Primary hypertension 2023         Past Surgical History:   Procedure Laterality Date     SECTION      hip replacment Right     Leg Fracture repair         Time Tracking:     OT Date of  Treatment:    OT Start Time: 0931  OT Stop Time: 0947  OT Total Time (min): 16 min    Billable Minutes:Evaluation low    6/28/2025

## 2025-06-28 NOTE — TERTIARY TRAUMA SURVEY NOTE
TERTIARY TRAUMA SURVEY (TTS)    List Injuries Identified to Date:   Distal right humeral fracture  []Problems list reviewed  List Operations and Procedures:   Open reduction and internal fixation of right lateral epicondyle fracture and right lateral condyle fracture capitellum     Past Surgical History:   Procedure Laterality Date     SECTION      hip replacment Right     Leg Fracture repair         Incidental findings:   None     Past Medical History:   Hypertension   Impression   Arthritis    Comorbidities:    As above   Mental Health History (only pre-existing diagnosis, no new diagnosis): No known mental health history   Age <15: N/A     Active Ambulatory Problems     Diagnosis Date Noted    High risk medication use 2023    Hyperlipidemia 2023    Primary hypertension 2023    Latent tuberculosis 2023    Mixed anxiety depressive disorder 2023    Raynaud's phenomenon without gangrene 2023    Medication overuse headache 2023    Rheumatoid arthritis 2023    Tobacco user 2023    Hip pain, chronic, right 2023    Encounter to establish care 2023    Back pain with right-sided sciatica 2023    Encounter for screening mammogram for breast cancer 2023    Hallux valgus of right foot 2023    Avascular necrosis 2023    Encounter for colorectal cancer screening 2023    Anxiety and depression 2023    Chronic pain of both feet 2023    Encounter for Papanicolaou smear of cervix 04/10/2024    Decreased hearing of both ears 04/10/2024     Resolved Ambulatory Problems     Diagnosis Date Noted    No Resolved Ambulatory Problems     Past Medical History:   Diagnosis Date    Arthritis     Depression      Past Medical History:   Diagnosis Date    Arthritis     Back pain with right-sided sciatica 2023    Decreased hearing of both ears 04/10/2024    Depression     Mixed anxiety depressive disorder 2023     Primary hypertension 02/17/2023       Tertiary Physical Exam:     Physical Exam  Constitutional:       General: She is not in acute distress.     Appearance: Normal appearance. She is normal weight. She is not ill-appearing.   HENT:      Head: Normocephalic and atraumatic.      Right Ear: External ear normal.      Left Ear: External ear normal. There is no impacted cerumen.      Nose: Nose normal.      Mouth/Throat:      Mouth: Mucous membranes are moist.      Pharynx: Oropharynx is clear.   Eyes:      General: No scleral icterus.     Extraocular Movements: Extraocular movements intact.      Conjunctiva/sclera: Conjunctivae normal.      Pupils: Pupils are equal, round, and reactive to light.   Cardiovascular:      Rate and Rhythm: Normal rate.      Pulses: Normal pulses.   Pulmonary:      Effort: Pulmonary effort is normal. No respiratory distress.      Breath sounds: No wheezing.   Abdominal:      General: Abdomen is flat. There is no distension.      Palpations: Abdomen is soft.      Tenderness: There is no abdominal tenderness.   Musculoskeletal:         General: Signs of injury present. No deformity.      Cervical back: Normal range of motion. No tenderness.      Comments: Right upper extremity in a splint and cast per Orthopedic surgery; sensation and motor intact in the distal fingers   Skin:     General: Skin is warm.   Neurological:      General: No focal deficit present.      Mental Status: She is alert.      Motor: No weakness.         Imaging Review:     Imaging Results    None          Lab Review:   CBC:  Recent Labs   Lab Result Units 06/27/25 0627 06/28/25  0346   WBC x10(3)/mcL 8.65 11.09   RBC x10(6)/mcL 5.15 4.44   Hgb g/dL 12.4 10.7*   Hct % 41.8 36.1*   Platelet x10(3)/mcL 323 313   MCV fL 81.2 81.3   MCH pg 24.1* 24.1*   MCHC g/dL 29.7* 29.6*       CMP:  Recent Labs   Lab Result Units 06/27/25 0627 06/28/25  0346   Glucose mg/dL 80 169*   Calcium mg/dL 8.6 8.2*   Albumin g/dL 3.1* 2.9*  "  Protein Total gm/dL 6.5 5.8*   Sodium mmol/L 138 137   Potassium mmol/L 3.8 4.1   CO2 mmol/L 22 23   Chloride mmol/L 108* 108*   Blood Urea Nitrogen mg/dL 15.6 18.0   Creatinine mg/dL 0.82 0.70   ALP unit/L 136 129   ALT unit/L 18 12   AST unit/L 21 17   Bilirubin Total mg/dL 0.2 0.1       Troponin:  No results for input(s): "TROPONINI" in the last 2160 hours.    ETOH:  No results for input(s): "ETHANOL" in the last 72 hours.     Urine Drug Screen:  No results for input(s): "COCAINE", "OPIATE", "BARBITURATE", "AMPHETAMINE", "FENTANYL", "CANNABINOIDS", "MDMA" in the last 72 hours.    Invalid input(s): "BENZODIAZEPINE", "PHENCYCLIDINE"     Plan:   This is a 50-year-old female who presented as a mechanical fall from ground level.  She had a distal right femoral fracture status post ORIF with Orthopedic surgery on 06/27.    No acute surgical intervention  Regular diet   Multimodal pain control   No further imaging required   Plan for discharge today    Vishal Milner MD  [employer] General Surgery HO-1      "

## 2025-06-28 NOTE — DISCHARGE INSTRUCTIONS
Begin daily dry dressing changes 6/29/25. May cover with soft dressing or large band aid. Keep clean and dry. No showers to Post op day 7. Do not submerge. Do not apply ointments or creams.

## 2025-06-28 NOTE — PROGRESS NOTES
"[facility]  Orthopedics  Progress Note    Patient Name: Vimal Beasley  MRN: [id number]  Admission Date: 6/27/2025  Hospital Length of Stay: 0 days  Attending Provider: Ashok Witt Jr., *  Primary Care Provider: Laura, Primary Doctor  Follow-up For: Procedure(s) (LRB):  OPEN TREATMENT, FRACTURE, HUMERUS, EPICONDYLE (Right)  ORIF, FRACTURE, HUMERUS, CONDYLE (Right)    Post-Operative Day: 1 Day Post-Op  Subjective:     Principal Problem:Displaced fracture (avulsion) of lateral epicondyle of right humerus, initial encounter for closed fracture    Principal Orthopedic Problem:  Left distal humerus fracture; SX- distal humerus ORIF on 06/27/2025    Interval History:  Patient resting comfortably in bed.  Pain well managed. NAEON. NAD. AFVSS.    Review of patient's allergies indicates:  No Known Allergies    Current Facility-Administered Medications   Medication    acetaminophen tablet 650 mg    docusate sodium capsule 100 mg    electrolyte-A infusion    enoxaparin injection 40 mg    gabapentin capsule 300 mg    lactated ringers infusion    magnesium hydroxide 400 mg/5 ml suspension 2,400 mg    melatonin tablet 6 mg    methocarbamoL tablet 500 mg    morphine injection 4 mg    oxyCODONE immediate release tablet 5 mg    oxyCODONE immediate release tablet Tab 10 mg    polyethylene glycol packet 17 g     Objective:     Vital Signs (Most Recent):  Temp: 98.3 °F (36.8 °C) (06/28/25 0332)  Pulse: 74 (06/28/25 0332)  Resp: 18 (06/28/25 0619)  BP: (!) 151/69 (06/28/25 0332)  SpO2: 95 % (06/28/25 0332) Vital Signs (24h Range):  Temp:  [95.6 °F (35.3 °C)-98.4 °F (36.9 °C)] 98.3 °F (36.8 °C)  Pulse:  [63-82] 74  Resp:  [12-18] 18  SpO2:  [93 %-100 %] 95 %  BP: ()/() 151/69     Weight: 73 kg (161 lb)  Height: 5' 2" (157.5 cm)  Body mass index is 29.45 kg/m².      Intake/Output Summary (Last 24 hours) at 6/28/2025 0719  Last data filed at 6/27/2025 1441  Gross per 24 hour   Intake 1240 ml "   Output --   Net 1240 ml       Physical exam:   MSK exam:  Right upper extremity compartments are soft and warm.  There are no signs or symptoms of a DVT or infection.  Her dressing is clean dry and intact.  Hinged elbow brace in place unlocked.  Tolerates gentle flexion and extension of the elbow.  Able to flex and extend all fingers appropriately.  Neurovascularly intact distally.    Significant Labs:   Recent Lab Results         06/28/25  0346   06/27/25  1232        Albumin/Globulin Ratio 1.0         ABO and RH   O POS       Albumin 2.9                  ALT 12         Anion Gap 6.0         AST 17         Baso # 0.04         Basophil % 0.4         BILIRUBIN TOTAL 0.1         BUN 18.0         BUN/CREAT RATIO 26         Calcium 8.2         Chloride 108         CO2 23         Creatinine 0.70         eGFR >60  Comment: Estimated GFR calculated using the CKD-EPI creatinine (2021) equation.         Eos # 0.12         Eos % 1.1         Globulin, Total 2.9         Glucose 169         Hematocrit 36.1         Hemoglobin 10.7         Immature Grans (Abs) 0.04         Immature Granulocytes 0.4         Lymph # 1.84         LYMPH % 16.6         MCH 24.1         MCHC 29.6         MCV 81.3         Mono # 0.96         Mono % 8.7         MPV 9.9         Neut # 8.09         Neut % 72.8         nRBC 0.0         Platelet Count 313         Potassium 4.1         PROTEIN TOTAL 5.8         RBC 4.44         RDW 18.5         Sodium 137         WBC 11.09               All pertinent labs within the past 24 hours have been reviewed.    Significant Imaging: I have reviewed all pertinent imaging results/findings.    Assessment/Plan:     Active Diagnoses:    Diagnosis Date Noted POA    PRINCIPAL PROBLEM:  Displaced fracture (avulsion) of lateral epicondyle of right humerus, initial encounter for closed fracture [S42.431A] 06/27/2025 Yes      Problems Resolved During this Admission:     50-year-old female status post left distal humerus  ORIF on 06/27/2025  -ALEXE NWB/ROMAT in hinged elbow brace x 12 weeks.  -DVT ppx: lovenox  -complete postop antibiotics   -Begin daily dry dressing changes 6/29/25. May cover with soft dressing or large band aid. Keep clean and dry. No showers to POD 7. Do not submerge. Do not apply ointments or creams.   -okay to DC from ortho standpoint.  -follow up with Dr. Hernandez in outpatient clinic    Gerri Lang PA-C  Orthopedics  [facility] - [address] [place] [address]

## 2025-06-28 NOTE — DISCHARGE SUMMARY
[facility]  General Surgery  Discharge Summary      Patient Name: Vimal Beasley  MRN: [id number]  Admission Date: 6/27/2025  Hospital Length of Stay: 0 days  Discharge Date and Time: 06/28/2025 8:52 AM  Attending Physician: Ashok Witt Jr., *   Discharging Provider: Vishal Milner MD  Primary Care Provider: No, Primary Doctor    HPI:   No notes on file    Procedure(s) (LRB):  OPEN TREATMENT, FRACTURE, HUMERUS, EPICONDYLE (Right)  ORIF, FRACTURE, HUMERUS, CONDYLE (Right)      Indwelling Lines/Drains at time of discharge:   Lines/Drains/Airways       None                 Hospital Course: 50F with no significant past medical history who presented as a ground level mechanical fall.  She was found to have a right humeral fracture.  Orthopedic surgery was consulted and performed a ORIF.  She tolerated the procedure well.  Today, she is tolerating diet, pain is well controlled, and has voided spontaneously.  She meets discharge criteria and will follow up with Orthopedic surgery.  She is in agreement with this plan.  She understands she will be no weight-bearing for 12 weeks postoperatively for the right upper extremity.    Goals of Care Treatment Preferences:  Code Status: Full Code      Consults:   Consults (From admission, onward)          Status Ordering Provider     Inpatient consult to Orthopedic Surgery  Once        Provider:  Joshua Ochoa MD Completed VENABLE, JACQUELINE            Significant Diagnostic Studies: Labs: All labs within the past 24 hours have been reviewed  Radiology:   X-Ray Elbow 2 Views Right   Final Result      As above.         Electronically signed by: [place] Shanks   Date:    06/27/2025   Time:    15:14      SURG FL Surgery Fluoro Usage   Final Result      CT 3D Rendering WO Independent Workstation   Final Result      CT Previous   Final Result      Xray Previous   Final Result            Pending Diagnostic Studies:       None          Final Active  Diagnoses:    Diagnosis Date Noted POA    PRINCIPAL PROBLEM:  Displaced fracture (avulsion) of lateral epicondyle of right humerus, initial encounter for closed fracture [S42.431A] 06/27/2025 Yes      Problems Resolved During this Admission:      Discharged Condition: stable    Disposition: Home or Self Care    Follow Up:   Follow-up Information       [facility] Follow up.    Specialty: Trauma Surgery  Why: As needed  Contact information:  [address] [place] [address] [address] [place] [place] [address] [phone]                         Patient Instructions:      Diet Adult Regular     Lifting restrictions   Order Comments: No weight-bearing of right upper extremity for 12 weeks; we will follow up with Orthopedic surgery     Ice to affected area     Keep surgical extremity elevated     Other restrictions (specify):   Order Comments: No weight-bearing of right upper extremity for 12 weeks     Notify your health care provider if you experience any of the following:  temperature >100.4     Notify your health care provider if you experience any of the following:  persistent nausea and vomiting or diarrhea     Notify your health care provider if you experience any of the following:  severe uncontrolled pain     Leave dressing on - Keep it clean, dry, and intact until clinic visit     Activity as tolerated     Medications:  Reconciled Home Medications:      Medication List        START taking these medications      acetaminophen 325 MG tablet  Commonly known as: TYLENOL  Take 2 tablets (650 mg total) by mouth every 6 (six) hours.     methocarbamoL 500 MG Tab  Commonly known as: Robaxin  Take 1 tablet (500 mg total) by mouth every 8 (eight) hours. for 10 days     oxyCODONE 5 MG immediate release tablet  Commonly known as: ROXICODONE  Take 1 tablet (5 mg total) by mouth every 4 (four) hours as needed for Pain (Uncontrolled with over-the-counter medication).            CHANGE how you take these medications       gabapentin 300 MG capsule  Commonly known as: NEURONTIN  Take 1 capsule (300 mg total) by mouth 3 (three) times daily.  What changed: when to take this            CONTINUE taking these medications      ARTHRITIS STRENGTH BC POWDER ORAL  Take by mouth. 3 packs in the am and 3 packs in the afternoon     valsartan 80 MG tablet  Commonly known as: DIOVAN  Take 1 tablet (80 mg total) by mouth every evening.            ASK your doctor about these medications      hydroxychloroquine 200 mg tablet  Commonly known as: PLAQUENIL  Take 1.5 tablets (300 mg total) by mouth once daily. After food            Time spent on the discharge of patient: 20 minutes    Vishal Milner MD  General Surgery  [facility]

## 2025-06-28 NOTE — HOSPITAL COURSE
50F with no significant past medical history who presented as a ground level mechanical fall.  She was found to have a right humeral fracture.  Orthopedic surgery was consulted and performed a ORIF.  She tolerated the procedure well.  Today, she is tolerating diet, pain is well controlled, and has voided spontaneously.  She meets discharge criteria and will follow up with Orthopedic surgery.  She is in agreement with this plan.  She understands she will be no weight-bearing for 12 weeks postoperatively for the right upper extremity.

## (undated) DEVICE — CUFF ATS 2 PORT SNGL BLDR 18IN

## (undated) DEVICE — DRAPE STERI U-SHAPED 47X51IN

## (undated) DEVICE — SUT VICRYL 2-0 36 CT-1

## (undated) DEVICE — GLOVE PROTEXIS HYDROGEL SZ9

## (undated) DEVICE — COVER MAYO STND XL 30X57IN

## (undated) DEVICE — DRAPE SHOULDER BEACH CHAIR

## (undated) DEVICE — ELECTRODE PATIENT RETURN DISP

## (undated) DEVICE — SOL NACL IRR 1000ML BTL

## (undated) DEVICE — IMPLANTABLE DEVICE
Type: IMPLANTABLE DEVICE | Site: ARM | Status: NON-FUNCTIONAL
Removed: 2025-06-27

## (undated) DEVICE — DRESSING XEROFORM NONADH 1X8IN

## (undated) DEVICE — GOWN SMARTGOWN 3XL XLONG

## (undated) DEVICE — DRIVER HCS 3.5MM

## (undated) DEVICE — COVER FULLGUARD SHOE HIGH-TOP

## (undated) DEVICE — BANDAGE VELCLOSE ELAS 6INX5YD

## (undated) DEVICE — KIT SURGICAL TURNOVER

## (undated) DEVICE — TAPE SILK 3IN

## (undated) DEVICE — GLOVE PROTEXIS BLUE LATEX 9

## (undated) DEVICE — BANDAGE VELCLOSE ELAS 4INX5YD

## (undated) DEVICE — Device

## (undated) DEVICE — COVER TABLE HVY DTY 60X90IN

## (undated) DEVICE — ELECTRODE REM POLYHESIVE II

## (undated) DEVICE — GLOVE SIGNATURE MICRO LTX 6

## (undated) DEVICE — SLING ARM COMFT NAVY BLU LG

## (undated) DEVICE — APPLICATOR CHLORAPREP ORN 26ML

## (undated) DEVICE — GLOVE PROTEXIS NEU-THERA SZ6